# Patient Record
Sex: FEMALE | Race: WHITE | Employment: OTHER | URBAN - METROPOLITAN AREA
[De-identification: names, ages, dates, MRNs, and addresses within clinical notes are randomized per-mention and may not be internally consistent; named-entity substitution may affect disease eponyms.]

---

## 2022-10-04 ENCOUNTER — HOSPITAL ENCOUNTER (INPATIENT)
Age: 82
LOS: 6 days | Discharge: HOME | DRG: 071 | End: 2022-10-10
Attending: PHYSICAL MEDICINE & REHABILITATION | Admitting: PHYSICAL MEDICINE & REHABILITATION
Payer: MEDICARE

## 2022-10-04 PROBLEM — G93.41 METABOLIC ENCEPHALOPATHY: Status: ACTIVE | Noted: 2022-10-04

## 2022-10-04 PROCEDURE — 6370000000 HC RX 637 (ALT 250 FOR IP): Performed by: PHYSICAL MEDICINE & REHABILITATION

## 2022-10-04 PROCEDURE — 99223 1ST HOSP IP/OBS HIGH 75: CPT | Performed by: PHYSICAL MEDICINE & REHABILITATION

## 2022-10-04 PROCEDURE — 1280000000 HC REHAB R&B

## 2022-10-04 RX ORDER — METHYLPREDNISOLONE 4 MG/1
8 TABLET ORAL NIGHTLY
Status: COMPLETED | OUTPATIENT
Start: 2022-10-05 | End: 2022-10-05

## 2022-10-04 RX ORDER — SENNA PLUS 8.6 MG/1
1 TABLET ORAL NIGHTLY
Status: DISCONTINUED | OUTPATIENT
Start: 2022-10-04 | End: 2022-10-10 | Stop reason: HOSPADM

## 2022-10-04 RX ORDER — AMIODARONE HYDROCHLORIDE 200 MG/1
100 TABLET ORAL DAILY
COMMUNITY

## 2022-10-04 RX ORDER — METHYLPREDNISOLONE 4 MG/1
4 TABLET ORAL
Status: COMPLETED | OUTPATIENT
Start: 2022-10-05 | End: 2022-10-06

## 2022-10-04 RX ORDER — BISOPROLOL FUMARATE 5 MG/1
5 TABLET ORAL DAILY
Status: ON HOLD | COMMUNITY
Start: 2022-09-16 | End: 2022-10-10 | Stop reason: HOSPADM

## 2022-10-04 RX ORDER — METHYLPREDNISOLONE 4 MG/1
4 TABLET ORAL
Status: COMPLETED | OUTPATIENT
Start: 2022-10-05 | End: 2022-10-09

## 2022-10-04 RX ORDER — ACETAMINOPHEN 325 MG/1
650 TABLET ORAL
Status: DISCONTINUED | OUTPATIENT
Start: 2022-10-04 | End: 2022-10-05

## 2022-10-04 RX ORDER — RIVAROXABAN 20 MG/1
20 TABLET, FILM COATED ORAL DAILY
COMMUNITY
Start: 2022-09-17

## 2022-10-04 RX ORDER — POLYETHYLENE GLYCOL 3350 17 G/17G
17 POWDER, FOR SOLUTION ORAL DAILY PRN
Status: DISCONTINUED | OUTPATIENT
Start: 2022-10-04 | End: 2022-10-10 | Stop reason: HOSPADM

## 2022-10-04 RX ORDER — METHYLPREDNISOLONE 4 MG/1
4 TABLET ORAL
Status: COMPLETED | OUTPATIENT
Start: 2022-10-05 | End: 2022-10-07

## 2022-10-04 RX ORDER — AMIODARONE HYDROCHLORIDE 200 MG/1
100 TABLET ORAL DAILY
Status: DISCONTINUED | OUTPATIENT
Start: 2022-10-05 | End: 2022-10-10 | Stop reason: HOSPADM

## 2022-10-04 RX ORDER — LACTULOSE 10 G/15ML
20 SOLUTION ORAL 3 TIMES DAILY
Status: DISCONTINUED | OUTPATIENT
Start: 2022-10-04 | End: 2022-10-10

## 2022-10-04 RX ORDER — BACLOFEN 10 MG/1
10 TABLET ORAL 2 TIMES DAILY
Status: ON HOLD | COMMUNITY
Start: 2022-09-29 | End: 2022-10-10 | Stop reason: HOSPADM

## 2022-10-04 RX ORDER — DOCUSATE SODIUM 100 MG/1
100 CAPSULE, LIQUID FILLED ORAL DAILY
Status: DISCONTINUED | OUTPATIENT
Start: 2022-10-04 | End: 2022-10-10 | Stop reason: HOSPADM

## 2022-10-04 RX ORDER — ONDANSETRON 4 MG/1
4 TABLET, ORALLY DISINTEGRATING ORAL 4 TIMES DAILY PRN
Status: DISCONTINUED | OUTPATIENT
Start: 2022-10-04 | End: 2022-10-10 | Stop reason: HOSPADM

## 2022-10-04 RX ORDER — BUTALBITAL, ACETAMINOPHEN AND CAFFEINE 50; 325; 40 MG/1; MG/1; MG/1
1 TABLET ORAL EVERY 4 HOURS PRN
Status: DISCONTINUED | OUTPATIENT
Start: 2022-10-04 | End: 2022-10-10 | Stop reason: HOSPADM

## 2022-10-04 RX ORDER — METHYLPREDNISOLONE 4 MG/1
4 TABLET ORAL NIGHTLY
Status: COMPLETED | OUTPATIENT
Start: 2022-10-06 | End: 2022-10-08

## 2022-10-04 RX ADMIN — SENNOSIDES 8.6 MG: 8.6 TABLET, FILM COATED ORAL at 20:58

## 2022-10-04 RX ADMIN — ACETAMINOPHEN 650 MG: 325 TABLET ORAL at 20:58

## 2022-10-04 RX ADMIN — LACTULOSE 20 G: 10 SOLUTION ORAL at 21:00

## 2022-10-04 RX ADMIN — DOCUSATE SODIUM 100 MG: 100 CAPSULE, LIQUID FILLED ORAL at 20:58

## 2022-10-04 NOTE — PLAN OF CARE
ARU Interdisciplinary Plan of Care (IPOC)  [facility] Dr. Adwoa Cunningham [facility], [address]  [phone]  Fax: [phone]        Yarely Aleman    : 1940  Acct #: [de-identified]  MRN: [id number]   PHYSICIAN:  Ras Salas MD  Primary Active Problems:   Active Hospital Problems    Diagnosis Date Noted    Generalized weakness [R53.1] 10/05/2022     Priority: Medium    Gait disturbance [R26.9] 10/05/2022     Priority: Medium    GELLER (nonalcoholic steatohepatitis) [K75.81] 10/05/2022     Priority: Medium    Drug reaction [T50.905A] 10/05/2022     Priority: Medium    Essential hypertension [I10] 10/05/2022     Priority: Medium    Permanent atrial fibrillation ([facility] [address] [I48.21] 10/05/2022     Priority: Medium    Rheumatoid arthritis involving multiple sites ([facility] [address] [M06.9] 10/05/2022     Priority: Medium    Uncontrolled pain [R52] 10/05/2022     Priority: Medium    Metabolic encephalopathy [M19.80] 10/04/2022     Priority: Medium       Rehabilitation Diagnosis:     Metabolic encephalopathy [Z25.35]          CARE PLAN     NURSING:  Yarely Aleman while on this unit will:      Bowel and Bladder   [x] Be continent of bowel and bladder      [x] Have an adequate number of bowel movements   [] Urinate with no urinary retention >300ml in bladder   [] Bladder Scan: (details)   [] Complete bladder protocol with lujan removal   [] Initiate Bladder Program to toilet every ___ hours   [] Initiate Bowel Program to toilet every ___hours   [] Bladder training    [] Bowel training  Pulmonary   [x] Maintain O2 SATs at 92% or greater  Pain Management   [x] Have pain managed while on ARU        [] Be pain free by discharge    [x] Medication Management and Education  Maintenance of Skin Integrity/Wound Management   [x] Have no skin breakdown while on ARU   [] Have improved skin integrity via wound measurements   [] Have no signs/symptoms of infection via infection protection and monitoring at the          wound site  Fall Prevention   [x] Be free from injury during hospitalization via fall prevention measures     [] Disease management and Education  Precautions   [] Weight Bearing Precautions   [] Swallowing Precautions   [x] Monitoring of Risks of Complications   [x] DVT Prophylaxis    [] Fluid/electrolyte/Nutrition Management    [x] Complete education with patient/family with understanding demonstrated for   in-room safety with transfers to bed, toilet, wheelchair, shower as well as  bathroom activities and hygiene. [] Adjustment   [] Other:   Nursing interventions may include bowel/bladder training, education for medical assistive devices, medication education, O2 saturation management, energy conservation, stress management techniques, fall prevention, alarms protocol, seating and positioning, skin/wound care, pressure relief instruction,dressing changes,  infection protection, DVT prophylaxis, and/or assistance with in room safety with transfers to bed, toilet, wheelchair, shower as well as bathroom activities and hygiene. Patient/caregiver education for:   [x] Disease/sustained injury/management      [x] Medication Use   [] Surgical intervention   [x] Safety/Precautions   [x] Body mechanics and or joint protection   [x] Health maintenance         PHYSICAL THERAPY:  Goals:                  Short Term Goals  Time Frame for Short Term Goals: 7 tx days:  Short Term Goal 1: Pt will complete OOB transfers Ind. Short Term Goal 2: Pt will ambulate >/=150 ft over level surface and at least 10 ft of uneven surface Ind. Short Term Goal 3: Pt will ascend/descend curb step using SPC and 1 flight of stairs using railing/s Mod Ind. Short Term Goal 4: Pt will complete object retrieval using reacher Mod Ind. These goals were reviewed with this patient at the time of assessment and Luisa Poe is in agreement.      Plan of Care: Pt to be seen 5 days per week for a minimum of 60 minutes for 7 days. Current Treatment Recommendations: Strengthening, Balance training, Functional mobility training, Transfer training, Endurance training, Gait training, Stair training, Pain management, Home exercise program, Safety education & training, Patient/Caregiver education & training, Therapeutic activities community reintegration,animal assisted therapy, and concurrent/group therapy.     PT IRF-STEFFANY scores and goals for initial assessment:   Bed Mobility:   Sit to Lying  Assistance Needed: Independent  Comment: Ind in regular bed  CARE Score: 6  Discharge Goal: Independent  Roll Left and Right  Assistance Needed: Independent  Comment: Ind in regular bed  CARE Score: 6  Discharge Goal: Independent  Lying to Sitting on Side of Bed  Assistance Needed: Independent  Comment: Ind in regular bed  CARE Score: 6  Discharge Goal: Independent    Transfers:    Sit to Stand  Assistance Needed: Supervision or touching assistance  Comment: CGA without AD (definite use of BUE)  CARE Score: 4  Discharge Goal: Independent  Chair/Bed-to-Chair Transfer  Assistance Needed: Supervision or touching assistance  Comment: CGA without AD  CARE Score: 4  Discharge Goal: Independent     Car Transfer  Assistance Needed: Supervision or touching assistance  Comment: CGA without AD (used car as external support; self-assisted RLE)  CARE Score: 4  Discharge Goal: Independent    Ambulation:    Walking Ability  Does the Patient Walk?: Yes     Walk 10 Feet  Assistance Needed: Supervision or touching assistance  Comment: CGA without AD  CARE Score: 4  Discharge Goal: Independent     Walk 50 Feet with Two Turns  Assistance Needed: Supervision or touching assistance  Comment: CGA without AD  CARE Score: 4  Discharge Goal: Independent     Walk 150 Feet  Assistance Needed: Supervision or touching assistance  Comment: CGA without AD; pt was able to ambulate up to 160 ft today  CARE Score: 4  Discharge Goal: Independent     Walking 10 Feet on Uneven Surfaces  Assistance Needed: Supervision or touching assistance  Comment: CGA without AD over concrete and outdoor ramp  CARE Score: 4  Discharge Goal: Independent     1 Step (Curb)  Assistance Needed: Partial/moderate assistance  Comment: Min A without AD (pauses to readjust JOSE prior to stepping up/down; required HHA on descent due to fear of falling)  CARE Score: 3  Discharge Goal: Independent     4 Steps  Assistance Needed: Supervision or touching assistance  Comment: CGA using railings with consistent step-through pattern on ascent/descent of 4\"/6\" step heights  CARE Score: 4  Discharge Goal: Independent     12 Steps  Assistance Needed: Supervision or touching assistance  Comment: CGA using railings with consistent step-through pattern on ascent/descent of 4\"/6\" step heights  CARE Score: 4  Discharge Goal: Independent    Gait Deviations: []None []Slow bk  [] Increased JOSE  [] Staggers []Deviated Path  [] Decreased step length  [] Decreased step height  []Decreased arm swing  [] Shuffles  [] Decreased head and trunk rotation  []other:        Wheelchair:  w/c Ability: Wheelchair Ability  Uses a Wheelchair and/or Scooter?: No                Balance:        Object: Picking Up Object  Assistance Needed: Supervision or touching assistance  Comment: CGA in unsupported stance but required use of reacher  CARE Score: 4  Discharge Goal: Independent  OCCUPATIONAL THERAPY:  Goals:             Short Term Goals  Time Frame for Short Term Goals: STGs=LTGs :  Long Term Goals  Time Frame for Long Term Goals : 7-10 days or until d/c. Long Term Goal 1: Pt will complete oral hygiene and grooming tasks c Mod I.  Long Term Goal 2: Pt will complete total body bathing c AE PRN and Mod I.  Long Term Goal 3: Pt will complete UB dressing c IND. Long Term Goal 4: Pt will complete LB dressing c AE PRN and Mod I.  Long Term Goal 5: Pt will doff/don footwear c AE PRN and Mod I.   Additional Goals?: Yes  Long Term Goal 6: Pt will complete toileting c Mod I.  Long Term Goal 7: Pt will perform functional transfers (bed, chair, toilet, shower) c DME PRN and Mod I.  Long Term Goal 8: Pt will perform therex/therax to facilitate an increase in strength/endurance (c emphasis on dynamic standing balance/tolerance > 8 mins) c Mod I.  Long Term Goal 9: Pt will perform home management tasks c Mod I. :    These goals were reviewed with this patient at the time of assessment and Yobani Killian is in agreement    Plan of Care:  Pt to be seen 5 days per week for a minimum of 60 minutes for 10 days. Occupational Therapy Plan  Current Treatment Recommendations: Strengthening, Balance training, Functional mobility training, Endurance training, Pain management, Safety education & training, Patient/Caregiver education & training, Equipment evaluation, education, & procurement, Positioning, Self-Care / ADL, Home management training, Cognitive/Perceptual training         cognitive training, home management, energy conservation training, community reintegration, splint fabrication, patient/caregiver education and training, animal assisted therapy, and concurrent and/or group therapy. OT IRF-STEFFANY scores and goals for initial assessment:    ADLs:    Eating: Eating  Assistance Needed: Independent  Comment: Pt able to open packages to eat breakfast independently during session. CARE Score: 6  Discharge Goal: Independent       Oral Hygiene: Oral Hygiene  Assistance Needed: Supervision or touching assistance  Comment: SBA to perform oral hygiene routine in stance. CARE Score: 4  Discharge Goal: Independent    UB/LB Bathing: Shower/Bathe Self  Assistance Needed: Supervision or touching assistance  Comment: Pt completed full shower, majority seated, with CGA/SBA in stance to wash julisa area and rear.   CARE Score: 4  Discharge Goal: Independent    UB Dressing: Upper Body Dressing  Assistance Needed: Supervision or touching assistance  Comment: SBA to reach for clothing in closet, able to doff/don shirt IND. CARE Score: 4  Discharge Goal: Independent         LB Dressing: Lower Body Dressing  Assistance Needed: Supervision or touching assistance  Comment: CGA/SBA in stance to manage underwear and pants over hips. CARE Score: 4  Discharge Goal: Independent    Donning and [place] Footwear: Putting On/Taking Off Footwear  Assistance Needed: Setup or clean-up assistance  Comment: Pt able to doff/don socks and tennis shoes. CARE Score: 5  Discharge Goal: Independent      Toiletin [address] needed: Supervision or touching assistance  Comment: SBA to manage clothing in stance and able to perform perineal hygiene seated after episode of urination. CARE Score: 4  Discharge Goal: Independent      Toilet Transfers: Toilet Transfer  Assistance needed: Supervision or touching assistance  Comment: CG/SBA with use of 2WW and grab bars. CARE Score: 4  Discharge Goal: Independent      SPEECH THERAPY: (If ordered)  Plan of Care and Goals:   LTG       Duration/Frequency of Treatment  Duration of Treatment: No ST recommended                                                    LTG:                           Treatments may include speech/language/communication therapy, cognitive training, animal assisted therapy, group therapy, education, and/or dysphagia therapy based on the above goals. Co-treats where appropriate with PT or OT to facilitate patient goals in functional tasks. These goals were reviewed with this patient at the time of assessment and Felicity Camacho is in agreement. CASE MANAGEMENT:  Goals:   Assist patient/family with discharge planning, patient/family counseling, assistance in obtaining recommended equipment and other services, and coordination with insurance during ARU stay. Patient Goals:  Return to maximum level of independent function.      Nutrition goal: Patient will consume at least 50-75% at meals during stay     Activities Prior to Admit:   Homemaking Responsibilities: Yes  Active : No  Mode of Transportation: [address]: Pt enjoys reading. Sherif Jean, Spouse manages finances. Pt and spouse share grocery shopping, Jew,         Intensity of Therapy  Marycarmen Martin will be seen a minimum of 3 hours of therapy per day/a minimum of 5 out of 7 days per week. [] In this rare instance due to the nature of this patient's medical involvement, this patient will be seen 15 hours per week (900 minutes within a 7 day period). Treatments may include therapeutic exercises, gait training, neuromuscular re-ed, transfer training, community reintegration, bed mobility, w/c mobility and training, self care, home mgmt, cognitive training, energy conservation,dysphagia tx, speech/language/communication therapy, group therapy, and patient/family education. In addition, dietician/nutritionist may monitor calorie count as well as intake and collaboratively work with SLP on dietary upgrades. Neuropsychology/Psychology may evaluate and provide necessary support. Group therapy as appropriate to facilitate improved endurance, STR, COORD, function, safety, transfers, awareness and insight into deficits, problem solving, memory, and social interaction and engagement.     Medical issues being managed closely and that require 24 hour availability of a physician:   [] Swallowing Precautions                                     [] Weight bearing precautions   [] Wound Care                             [x] Infection Prevention   [x] DVT Prophylaxis/assessment              [x] Monitoring for complications    [x] Fall Precautions/Prevention                         [x] Fluid/Electrolyte/Nutrition Balance   [] Voice Protection                           [x] Medication Management   [x] Respiratory                   [x] Pain Mgmt   [x] Bowel/Bladder Fx    Medical Prognosis: [] Good  [x] Fair    [] Guarded   Total expected IRF days 14                                            Physician anticipated functional outcomes:  FWW and [facility] OT/PT and supervision. Rehab Goals:   [] Return to premorbid function of_______________________________.    [] Independent   [] Mod I  [x] Supervision  [] CGA   [] Min A   [] Mod A  Level for ambulation []without assistive device  [x] with assistive device        [] Independent   [] Mod I  [x] Supervision [] CGA   [] Min A   [] Mod A  Level for transfers []without assistive device  [x] with assistive device         [] Independent   [] Mod I  [x] Supervision [] CGA   [] Min A   [] Mod A Level with ADL's []without assistive device   [x] with assistive device     ___________________     Level with cognitive skills requiring [] No assist [x] Supervision  [] Active Assist/Cues     [] Maximize level of mobility and ADL's to decrease burden on caregiver    IPOC brief synthesis of Preadmission Screen, Post-Admission Evaluation, and Therapy Evaluations: Acute inpatient rehabilitation with occupational and physical therapy 180 minutes 5 out of every 7 days. Will address basic and  advancing mobility with self-care instruction and adaptive equipment training. Caregiver education will be offered. Expected length of stay  prior to a supervised level of function for discharge home with a walker and [facility] OT/PT is 2 weeks. Additional recommendation:     Metabolic encephalopathy with gait disturbance: The patient requires daily occupational and physical therapy with speech-language pathology. We will provide treatment in a calm and consistent environment to improve retention of new information and to reduce distractions. She will get written and verbal instructions when possible. We will involve caregivers with training is much as possible. We must minimize medication use, monitor her GELLER and provide nutritional support.   She needs adaptive equipment training, pulmonary hygiene measures and DVT prophylaxis. DVT prophylaxis: Medical team at the outside hospital resumed her Xarelto for her atrial fibrillation. This should protect her against new DVT. I must periodically monitor her hemoglobin while on this medication. Weightbearing activities will be pursued daily. GI prophylaxis is available. GELLER: Lactulose 20 g 3 times daily and monitoring her ammonia level. Avoiding acetaminophen. Uncontrolled pain: We must minimize use of acetaminophen with her liver disease. Steroid taper will be helpful. Diathermy and progressive mobilization. Range of motion exercises. Low-dose tramadol if analgesics are needed. Atrial fibrillation: She has now anticoagulated with Xarelto. Cordarone for rate control. Vital signs are checked at rest and with activity. Daily weights to detect any decompensation. Hypertension: Target systolic blood pressures 014-348. Currently her blood pressure is controlled without medications but will monitor this closely. Rheumatoid arthritis: This disease is likely contributing to her arthralgias. Her steroid taper will likely help control her symptoms in the short-term. Long-term management will be deferred to her PCP. Anticipated discharge destination:    [] Home Independently   [x] Home with supervision    []SNF     [] Other       This plan has been reviewed with me in a language I understand. I have had the opportunity to include my input with my therapy team.    ________________________________________________   ______________________  Patient/Significant Other      Date    I have reviewed this initial plan of care and agree with its contents:    Title   Name    Date    Time    Physician: Marcelo Cross MD 10/6/2022 10:54 AM    Case Mgmt:  [facility] 10/5/2022 1033    OT: Yadira Du, [address] [facility], OTR/L #[id number] 10/05/22 1612    PT: Yin Espinoza PT     10/05/2022    11:52    RN: Michael Herndon RN 10/4/22  6pm    ST: Mariluz Graham.  Liz Rodrigez, [address] [facility], [address] [place] [address] 10/5/2022  11:12 AM      Dietician: CARISSA Melendez  10/05/2022   14:05     ADMIT DATE:10/4/2022

## 2022-10-04 NOTE — PROGRESS NOTES
[facility] Admission Assessment      A Complete drug regimen review was completed for this patient this date. [x]  No clinically significant medication issue was identified   []  Yes, a clinically significant medication issue was identified     []  Adverse Drug Event:    []  Allergy:    []  Side Effect:    []  Ineffective Therapy:    []  Drug interaction:     []  Duplicate Therapy:    []  Untreated Indication:    []  Non-adherence:    []  Other:  Nursing contacted the physician:       Date:                Time:    Actions recommended by physician were completed:   Date:                 Time:  Action(s) Taken:             []  New Physician Order Received    []  Issue Noted by Physician; However No Action Required    []  Other:        Ethnicity  \"Are you of , /a, or [place] origin? \"  Check all that apply:  [x] A. No, not of , /a, or [place] Origin  [] B.  Yes, [place] American, Chicano/a  [] C.  Yes, [address] [place] [address]  [] D.  Yes, [place]  [] E.  Yes, another , , or [place] origin  [] X. Patient unable to respond    Race  \"What is your race? \"  Check all that apply:  [x] A. White  [] B. Black or   [] C. American [place] [facility] or [place] Native  [] D.  [place] [facility]  [] E. [place]  [] F. Ugandan  [] G. [place]  [] Sonya Ram  [] I. Yazu  [] J.  Other   [] K.   [] L. [place] or [place]ira  [] M. Z[place]ian  [] N. Other [facility]  [] X. Patient unable to respond    Language  A. \"What is your preferred language? \"   English    B. \"Do you need or want an  to communicate with a doctor or health care staff? \"  Check only one:  [x] 0. No  [] 1. Yes  [] 9. Unable to determine    Transportation  \"Has lack of transportation kept you from medical appointments, meetings, work, or from getting things needed for daily living? \"Check all that apply:  [] A.  Yes, it has kept me from medical appointments or from getting my medications  [] B.  Yes, it has kept me from non-medical meetings, appointments, work, or from getting things that I need  [x] C.  No  [] X. Patient unable to respond    Hearing  Ability to hear (with hearing aid or hearing appliances if normally used)  [x]  0. Adequate - no difficulty in normal conversation, social interaction, listening to TV  []  1. Minimal difficulty - difficulty in some environments (e.g. when person speaks softly or setting is noisy)  []  2. Moderate difficulty - speaker has to increase volume and speak distinctly   []  3. Highly impaired - absence of useful hearing    Vision  Ability to see in adequate light (with glasses or other visual appliances)  [x]  0. Adequate - sees fine detail, such as regular print in newspapers/books  []  1. Impaired - sees large print, but nto regular print in newspapers/books  []  2. Moderately impaired - limited vision; not able to see newspaper headlines but can identify objects  []  3. Highly impaired - object identification in question, but eyes appear to follow objects  []  4. Severely impaired - no vision or sees only light, colors, or shapes; eyes do not appear to follow objects    Health Literacy  \"How often do you need to have someone help you when you read instructions, pamphlets, or other written material from your doctor or pharmacy? \"  [x]  0. Never  []  1. Rarely  []  2. Sometimes  []  3. Often  []  4. Always  []  8. Patient unable to respond    Signs and Symptoms of Delirium  A. Acute Onset Mental Status Change - Is there evidence of an acute change in mental status from the patient's baseline? [x] 0. No  [] 1. Yes    B. Inattention - Did the patient have difficulty focusing attention, for example being easily distractible or having difficulty keeping track of what was being said? [x]  0. Behavior not present  []  1. Behavior continuously present, does not fluctuate  []  2.   Behavior present, fluctuates (comes and goes, changes in severity)    C. Disorganized thinking - Was the patient's thinking disorganized or incoherent (rambling or irrelevant conversation, unclear or illogical flow of ideas, or unpredictable switching from subject to subject)? [x]  0. Behavior not present  []  1. Behavior continuously present, does not fluctuate  []  2. Behavior present, fluctuates (comes and goes, changes in severity)    D. Altered level of consciousness - Did the patient have altered level of consciousness as indicated by any of the following criteria? Vigilant - startled easily to any sound or touch  Lethargic - repeatedly dozed off while being asked questions, but responded to voice or touch  Stuporous - very difficulty to arouse and keep aroused for the interview  Comatose - could not be aroused  [x]  0. Behavior not present  []  1. Behavior continuously present, does not fluctuate  []  2. Behavior present, fluctuates (comes and goes, changes in severity)    Mood    \"Over the last 2 weeks, have you been bothered by any of the following problems?\" 1. Symptom Presence    0 = No  1 = Yes  9 = No Response 2. Symptom Frequency    0 = Never or 1 day  1 = 2-6 days (several days)  2 = 7-11 days (half or more of the days)  3 = 12-14 days (nearly every day)  **Leave blank if 'No Reponse'**      Enter scores in boxes    Column 1 Column 2   Little interest or pleasure in doing things   [x] 0. No  [] 1. Yes  [] 9. No Response [] 0. Never or one day  [] 1.  2-6 days (several days)  [] 2.  7-11 days (half or more of days)  [] 3.  12-14 days (nearly every day)     Feeling down, depressed, or hopeless   [x] 0. No  [] 1. Yes  [] 9. No Response [] 0. Never or one day  [] 1.  2-6 days (several days)  [] 2.  7-11 days (half or more of days)  [] 3.  12-14 days (nearly every day)   **If either A or B in column 2 is coded 2 or 3, CONTINUE asking the questions below. If not, END the interview. **     Trouble falling or staying asleep, or sleeping too much   [] 0. No  [] 1. Yes  [] 9. No Response [] 0. Never or one day  [] 1.  2-6 days (several days)  [] 2.  7-11 days (half or more of days)  [] 3.  12-14 days (nearly every day   Feeling tired or having little energy   [] 0. No  [] 1. Yes  [] 9. No Response [] 0. Never or one day  [] 1.  2-6 days (several days)  [] 2.  7-11 days (half or more of days)  [] 3.  12-14 days (nearly every day   Poor appetite or overeating     [] 0. No  [] 1. Yes  [] 9. No Response [] 0. Never or one day  [] 1.  2-6 days (several days)  [] 2.  7-11 days (half or more of days)  [] 3.  12-14 days (nearly every day   Feeling bad about yourself - or that you are a failure or have let yourself or your family down   [] 0. No  [] 1. Yes  [] 9. No Response [] 0. Never or one day  [] 1.  2-6 days (several days)  [] 2.  7-11 days (half or more of days)  [] 3.  12-14 days (nearly every day   Trouble concentrating on things, such as reading the newspaper or watching television   [] 0. No  [] 1. Yes  [] 9. No Response [] 0. Never or one day  [] 1.  2-6 days (several days)  [] 2.  7-11 days (half or more of days)  [] 3.  12-14 days (nearly every day   Moving or speaking so slowly that other people could have noticed. Or the opposite- being so fidgety or restless that you have been moving around a lot more than usual.   [] 0. No  [] 1. Yes  [] 9. No Response [] 0. Never or one day  [] 1.  2-6 days (several days)  [] 2.  7-11 days (half or more of days)  [] 3.  12-14 days (nearly every day   Thoughts that you would be better off dead, or of hurting yourself in some way.   [] 0. No  [] 1. Yes  [] 9. No Response [] 0. Never or one day  [] 1.  2-6 days (several days)  [] 2.  7-11 days (half or more of days)  [] 3.  12-14 days (nearly every day     Social Isolation  \"How often do you feel lonely or isolated from those around you? \"  [] 0. Never  [] 1. Rarely  [] 2. Sometimes  [] 3. Often  [] 4. Always  [] 8.   Patient unable to respond    Pain Effect on Sleep  \"Over the past 5 days, how much of the time has pain made it hard for you to sleep at night? \"  []  0. Does not apply - I have not had any pain or hurting in the past 5 days  []  1. Rarely or not at all  [x]  2. Occasionally  []  3. Frequently  []  4. Almost constantly  []  8. Unable to answer  **If the patient answers \"0. Does not apply\" to this question, skip the next two \"Pain Effect. Author Pedro Author Pedro \" questions**    Pain Interference with Therapy Activities  \"Over the past 5 days, how often have you limited your participation in rehabilitation therapy sessions due to pain? \"  [x]  0. Does not apply - I have not received rehabilitation therapy in the past 5 days  []  1. Rarely or not at all  []  2. Occasionally  []  3. Frequently  []  4. Almost constantly  []  8. Unable to answer    Pain Interference with Day-to-Day Activities: \"Over the past 5 days, how often have you limited your day-to-day activities (excluding rehabilitation therapy session)? \"  []  1. Rarely or not at all  [x]  2. Occasionally  []  3. Frequently  []  4. Almost constantly  []  8. Unable to answer    Nutritional Approaches  Check all of the following nutritional approaches that apply on admission:  []  A. Parenteral/IV feeding  []  B. Feeding tube (e.g., nasogastric or abdominal (PEG))  []  C. Mechanically altered diet - requires change in texture of food or liquids (e.g., pureed food, thickened liquids)  []  D. Therapeutic diet (e.g., low salt, diabetic, low cholesterol)  [x]  Z. None of the above    High Risk Drug Classes:  Use and Indication    Is taking: Check if the pt is taking any medications by pharmacological classification, not how it is used, in the following classes  Indication noted:  If column 1 is checked, check if there is an indication noted for all meds in the drug class Is taking  (check all that apply) Indication noted (check all that apply)   Antipsychotic [] [] Anticoagulant [] []   Antibiotic [] []   Opioid [] []   Antiplatelet [x] [x]   Hypoglycemic (including insulin) [] []   None of the above []     Special Treatments, Procedures, and Programs    Check all of the following treatments, procedures, and programs that apply on admission. On admission (check all that apply)   Cancer Treatments   A1. Chemotherapy []           A2. IV []           A3. Oral []           A10. Other []   B1. Radiation []   Respiratory Therapies   C1. Oxygen Therapy []           C2. Continuous []           C3. Intermittent []           C4. High-concentration []   D1. Suctioning []           D2. Scheduled []           D3. As needed []   E1. Tracheostomy Care []   F1. Invasive Mechanical Ventilator (ventilator or respirator) []   G1. Non-invasive Mechanical Ventilator []           G2. BiPAP []           G3. CPAP []   Other   H1. IV Medications []           H2. Vasoactive medications []           H3. Antibiotics []           H4. Anticoagulation []           H10. Other []   I1. Transfusions []   J1. Dialysis []           J2. Hemodialysis []           J3. Peritoneal dialysis []   O1. IV access []           O2. Peripheral []           O3. Midline []           O4.  Central (PICC, tunneled, port) []      None of the above (select if no Cancer, Respiratory, or Other boxes are checked) [x]

## 2022-10-05 PROBLEM — K75.81 NASH (NONALCOHOLIC STEATOHEPATITIS): Status: ACTIVE | Noted: 2022-10-05

## 2022-10-05 PROBLEM — T50.905A DRUG REACTION: Status: ACTIVE | Noted: 2022-10-05

## 2022-10-05 PROBLEM — I10 ESSENTIAL HYPERTENSION: Status: ACTIVE | Noted: 2022-10-05

## 2022-10-05 PROBLEM — R52 UNCONTROLLED PAIN: Status: ACTIVE | Noted: 2022-10-05

## 2022-10-05 PROBLEM — M06.9 RHEUMATOID ARTHRITIS INVOLVING MULTIPLE SITES (HCC): Status: ACTIVE | Noted: 2022-10-05

## 2022-10-05 PROBLEM — I48.21 PERMANENT ATRIAL FIBRILLATION (HCC): Status: ACTIVE | Noted: 2022-10-05

## 2022-10-05 PROBLEM — R53.1 GENERALIZED WEAKNESS: Status: ACTIVE | Noted: 2022-10-05

## 2022-10-05 PROBLEM — R26.9 GAIT DISTURBANCE: Status: ACTIVE | Noted: 2022-10-05

## 2022-10-05 PROCEDURE — 97535 SELF CARE MNGMENT TRAINING: CPT

## 2022-10-05 PROCEDURE — 99232 SBSQ HOSP IP/OBS MODERATE 35: CPT | Performed by: PHYSICAL MEDICINE & REHABILITATION

## 2022-10-05 PROCEDURE — 6370000000 HC RX 637 (ALT 250 FOR IP): Performed by: PHYSICAL MEDICINE & REHABILITATION

## 2022-10-05 PROCEDURE — 6360000002 HC RX W HCPCS: Performed by: PHYSICAL MEDICINE & REHABILITATION

## 2022-10-05 PROCEDURE — 1280000000 HC REHAB R&B

## 2022-10-05 PROCEDURE — 97162 PT EVAL MOD COMPLEX 30 MIN: CPT

## 2022-10-05 PROCEDURE — 97530 THERAPEUTIC ACTIVITIES: CPT

## 2022-10-05 PROCEDURE — 97116 GAIT TRAINING THERAPY: CPT

## 2022-10-05 PROCEDURE — 94761 N-INVAS EAR/PLS OXIMETRY MLT: CPT

## 2022-10-05 PROCEDURE — 97166 OT EVAL MOD COMPLEX 45 MIN: CPT

## 2022-10-05 PROCEDURE — 92523 SPEECH SOUND LANG COMPREHEN: CPT

## 2022-10-05 RX ORDER — BISACODYL 10 MG
10 SUPPOSITORY, RECTAL RECTAL PRN
Status: DISCONTINUED | OUTPATIENT
Start: 2022-10-05 | End: 2022-10-10 | Stop reason: HOSPADM

## 2022-10-05 RX ADMIN — AMIODARONE HYDROCHLORIDE 100 MG: 200 TABLET ORAL at 08:14

## 2022-10-05 RX ADMIN — METHYLPREDNISOLONE 4 MG: 4 TABLET ORAL at 13:39

## 2022-10-05 RX ADMIN — BISACODYL 10 MG: 10 SUPPOSITORY RECTAL at 19:10

## 2022-10-05 RX ADMIN — DOCUSATE SODIUM 100 MG: 100 CAPSULE, LIQUID FILLED ORAL at 08:14

## 2022-10-05 RX ADMIN — LACTULOSE 20 G: 10 SOLUTION ORAL at 13:40

## 2022-10-05 RX ADMIN — RIVAROXABAN 20 MG: 20 TABLET, FILM COATED ORAL at 08:14

## 2022-10-05 RX ADMIN — LACTULOSE 20 G: 10 SOLUTION ORAL at 20:08

## 2022-10-05 RX ADMIN — ACETAMINOPHEN 650 MG: 325 TABLET ORAL at 08:14

## 2022-10-05 RX ADMIN — METHYLPREDNISOLONE 4 MG: 4 TABLET ORAL at 18:48

## 2022-10-05 RX ADMIN — METHYLPREDNISOLONE 4 MG: 4 TABLET ORAL at 06:14

## 2022-10-05 RX ADMIN — SENNOSIDES 8.6 MG: 8.6 TABLET, FILM COATED ORAL at 20:08

## 2022-10-05 RX ADMIN — METHYLPREDNISOLONE 8 MG: 4 TABLET ORAL at 20:09

## 2022-10-05 RX ADMIN — LACTULOSE 20 G: 10 SOLUTION ORAL at 08:14

## 2022-10-05 NOTE — PROGRESS NOTES
Comprehensive Nutrition Assessment    Type and Reason for Visit:  Initial, Consult (oral nutrition supplement)    Nutrition Recommendations/Plan:   Continue regular diet   Will trial oral nutrition supplement during stay  Encourage consistent meal intake  Will continue to follow up during stay      Malnutrition Assessment:  Malnutrition Status:  Insufficient data (10/05/22 1400)    Context:  Social/Environmental Circumstances       Nutrition Assessment:    Admit to acute rehab with hx weakness, metabolic encephalopathy. Able to tolerate regular diet. Reports loss of smell and taste since COVID-19 2 years ago. Inconsistent meals at home. Meal intake today %. Able to taste mainly sweets and some spicy foods. Discussed option to trial oral nutriton supplement during stay, hesitant but did agree to trial. Per hx no signficant weight loss in past 6 months. Will follow at moderate nutrition risk at this time. Nutrition Related Findings:    sitting up in chair on visit, lunch tray in room did eat more than 75% of meal, hx HTN, RA, GELLER Wound Type: None       Current Nutrition Intake & Therapies:    Average Meal Intake: %  Average Supplements Intake: None Ordered  ADULT DIET; Regular    Anthropometric Measures:  Height: 5' 3\" (160 cm)  Ideal Body Weight (IBW): 115 lbs (52 kg)    Admission Body Weight: 160 lb (72.6 kg) (per MD office in May)  Current Body Weight: 156 lb 15.5 oz (71.2 kg), 136.5 % IBW. Weight Source: Bed Scale  Current BMI (kg/m2): 27.8        Weight Adjustment For: No Adjustment                 BMI Categories: Overweight (BMI 25.0-29. 9)    Estimated Daily Nutrient Needs:  Energy Requirements Based On: Formula  Weight Used for Energy Requirements: Current  Energy (kcal/day): 8524-0157  Weight Used for Protein Requirements: Ideal  Protein (g/day): 52-62 (1-1.2 g/kg)  Method Used for Fluid Requirements: ml/Kg  Fluid (ml/day): 1700 (25 ml/kg)    Nutrition Diagnosis:   Predicted inadequate energy intake related to altered taste perception as evidenced by poor intake prior to admission    Nutrition Interventions:   Food and/or Nutrient Delivery: Continue Current Diet, Start Oral Nutrition Supplement  Nutrition Education/Counseling: Education initiated  Coordination of Nutrition Care: Continue to monitor while inpatient  Plan of Care discussed with: patient/spouse in room    Goals:     Goals: PO intake 75% or greater, by next RD assessment       Nutrition Monitoring and Evaluation:   Behavioral-Environmental Outcomes: None Identified  Food/Nutrient Intake Outcomes: Food and Nutrient Intake, Supplement Intake  Physical Signs/Symptoms Outcomes: Biochemical Data, Meal Time Behavior, Skin, Weight, Nutrition Focused Physical Findings    Discharge Planning:    Continue current diet     Rosario Shine RD, LD  Contact: [phone]

## 2022-10-05 NOTE — PROGRESS NOTES
4 Eyes Skin Assessment     NAME:  Darek Louie  YOB: 1940  MEDICAL RECORD NUMBER:  [id number]    The patient is being assess for  Admission    I agree that 2 RN's have performed a thorough Head to Toe Skin Assessment on the patient. ALL assessment sites listed below have been assessed. Areas assessed by both nurses:    Head, Face, Ears, Shoulders, Back, Chest, Arms, Elbows, Hands, Sacrum. Buttock, Coccyx, Ischium, and Legs. Feet and Heels        Does the Patient have a Wound?  No noted wound(s)       Aryan Prevention initiated:  Yes   Wound Care Orders initiated:  NA    Pressure Injury (Stage 3,4, Unstageable, DTI, NWPT, and Complex wounds) if present place referral/consult order under [de-identified] NA    New and Established Ostomies if present place consult order under : NA      Nurse 1 eSignature: Electronically signed by Costa Montejo LPN on 26/2/41 at 4:81 PM EDT    **SHARE this note so that the co-signing nurse is able to place an eSignature**    Nurse 2 eSignature: Electronically signed by Joanna Crespo LPN on 23/9/73 at 7:34 PM EDT

## 2022-10-05 NOTE — H&P
Umu Jj    : 1940  [facility]  MRN: [id number]              History and physical      Admitting diagnosis: Encephalopathy ( [address])    Comorbid diagnoses impacting rehabilitation: Generalized weakness, gait disturbance, toxic reaction to baclofen, Geller with hyperammonemia, permanent atrial fibrillation, essential hypertension, chronic back pain, rheumatoid arthritis. Chief complaint: Back pain and poor sleep recently. History of present illness: Patient is an 82-year-old right-hand-dominant female who has chronic gait disturbance related to spinal issues and chronic neck and low back pain. On 10/2/2022 she presented to an outside emergency department with mental status changes and difficulty walking. This followed a COVID-19 infection in 2022 and a recalcitrant headache that had not been responding to outpatient medications of a wide variety. Recently she had been started on Ubrelvy and baclofen for the headaches. In the ED, she was found to be delirious with generalized weakness and difficulty swallowing. Her ammonia level was up but her head CT was clear. Chest and urine was study but no signs of infection were identified. A follow-up abdominal CT showed evidence of GELLER. Blood pressures were fluctuating as well. After neurologic consultation, it was decided that she had had a drug reaction to the baclofen complicating a rise in ammonia due to GELLER (metabolic encephalopathy). She was taken off baclofen, analgesics were reduced and she was treated with lactulose. Her mental status has yet to return to normal but is improving to where she can engage purposefully in therapies. She is unable to do her own toileting, transfers and self-care and cannot return directly home. She requires inpatient rehabilitation to address these issues. Review of systems: Chronic neck and back pain and stiffness. Positional dizziness. Poor appetite. Some urinary incontinence.   The remainder of their review of systems was negative except as mentioned in the history of present illness. Social History: Patient is  and she would use furniture cruising as her main way of getting through her home. She needs supervision with her medications. She was independent with her own personal care. She does not use tobacco nor alcohol. Prior (baseline) level of function: Modified independent with mobility and self-care. Current level of function: Moderate verbal cues and physical assistance needed for mobility and self-care. Allergies:  Patient has no known allergies. Past Medical History:   Cervical and lumbar arthritis with past surgeries on both areas.   Hypertension, osteopenia, rheumatoid arthritis, atrial fibrillation, COVID infection August 2022    Past Surgical History:     Cervical and lumbar spinal surgeries (the most recent in 2015), abdominal surgery    Current Medications:     Current Facility-Administered Medications:     [START ON 10/5/2022] amiodarone (CORDARONE) tablet 100 mg, 100 mg, Oral, Daily, BECKY Pires MD    methylPREDNISolone (MEDROL) tablet 24 mg, 24 mg, Oral, Once, MD Alexandria Ng ON 10/5/2022] methylPREDNISolone (MEDROL) tablet 4 mg, 4 mg, Oral, QAM AC, BECKY Pires MD    [START ON 10/5/2022] methylPREDNISolone (MEDROL) tablet 4 mg, 4 mg, Oral, Lunch, MD Alexandria Ng ON 10/5/2022] methylPREDNISolone (MEDROL) tablet 4 mg, 4 mg, Oral, Dinner, BECKY Pires MD    [START ON 10/5/2022] methylPREDNISolone (MEDROL) tablet 8 mg, 8 mg, Oral, Nightly, MD Alexandria Ng ON 10/6/2022] methylPREDNISolone (MEDROL) tablet 4 mg, 4 mg, Oral, Nightly, BECKY Pires MD    lactulose (CHRONULAC) 10 GM/15ML solution 20 g, 20 g, Oral, TID, MD Alexandria Ng ON 10/5/2022] rivaroxaban (XARELTO) tablet 20 mg, 20 mg, Oral, Daily with breakfast, Greg Conrad MD    butalbital-acetaminophen-caffeine (FIORICET, ESGIC) per tablet 1 tablet, 1 tablet, Oral, Q4H PRN, BECKY Sage MD    senna (SENOKOT) tablet 8.6 mg, 1 tablet, Oral, Nightly, BECKY Sage MD    docusate sodium (COLACE) capsule 100 mg, 100 mg, Oral, Daily, BECKY Sage MD    ondansetron (ZOFRAN-ODT) disintegrating tablet 4 mg, 4 mg, Oral, 4x Daily PRN, BECKY Sage MD    acetaminophen (TYLENOL) tablet 650 mg, 650 mg, Oral, 4x Daily WC, BECKY Sage MD    polyethylene glycol (GLYCOLAX) packet 17 g, 17 g, Oral, Daily PRN, BECKY Sage MD    magnesium hydroxide (MILK OF MAGNESIA) 400 MG/5ML suspension 30 mL, 30 mL, Oral, Daily PRN, BECKY Sage MD    Family History:   Hypertension, arthritis and heart disease    Exam:    Blood pressure 132/78, pulse 74, temperature 98.3 °F (36.8 °C), temperature source Oral, resp. rate 18, height 5' 3\" (1.6 m), weight 152 lb (68.9 kg). General: The patient was seen semirecumbent in bed. She was easily distracted but able to answer some questions directly. Hard of hearing. Oriented x2 only. HEENT: No signs of trauma to her head or neck. Only about 35 degrees of active cervical rotation bilaterally. She could bring her chin to chest without hesitation. Visual fields seem full. MMM. Neck supple without adenopathy or JVD. Pulmonary: Bibasilar rhonchi without wheezes or rales. Cardiac: Her rate was controlled but premature beats were noted. Soft systolic murmur. Abdomen: Patient's abdomen was soft and nondistended. Bowel sounds were present throughout. There was no rebound, guarding or masses noted. Spinal exam: Diminished lumbar lordosis. Guarded cervical and lumbar spinal range of motion. No gross malformations. Upper extremities: Patient was able to bring both hands up to her chin.  strength was fair. Tone was normal.  No tremor. Sensation was intact. Arthritic changes within the wrist and fingers bilaterally. Lower extremities: No signs of DVT. Trace edema. Giveaway with MMT across the hips, knees and ankles with arthralgias. Sitting balance was fair. Standing balance was poor. No results found for: WBC, HGB, HCT, MCV, PLT  No results found for: INR, PROTIME  No results found for: CREATININE, BUN, NA, K, CL, CO2  No results found for: ALT, AST, GGT, ALKPHOS, BILITOT      Impression: 82-year-old female with significant cervical and lumbar spinal disease and past surgeries, chronic pain, atrial fibrillation, hypertension and rheumatoid arthritis who was suffered mental status changes associated with [place] and a reaction to baclofen. Strengths for the patient: Supportive family, some experience with adaptive equipment and a reasonably accessible home. Limitations/barriers for the patient: Her age, her confusion and her risk for falling raising the risks of using anticoagulation for her A. fib. Recommendation: Acute inpatient rehabilitation with occupational and physical therapy 180 minutes 5 out of every 7 days. Will address basic and  advancing mobility with self-care instruction and adaptive equipment training. Caregiver education will be offered. Expected length of stay  prior to a supervised level of function for discharge home with a walker and [facility] OT/PT is 2 weeks. Additional recommendation:    Metabolic encephalopathy with gait disturbance: The patient requires daily occupational and physical therapy with speech-language pathology. We will provide treatment in a calm and consistent environment to improve retention of new information and to reduce distractions. She will get written and verbal instructions when possible. We will involve caregivers with training is much as possible. We must minimize medication use, monitor her GELLER and provide nutritional support. She needs adaptive equipment training, pulmonary hygiene measures and DVT prophylaxis.   DVT prophylaxis: Medical team at the outside hospital resumed her Xarelto for her atrial fibrillation. This should protect her against new DVT. I must periodically monitor her hemoglobin while on this medication. Weightbearing activities will be pursued daily. GI prophylaxis is available. GELLER: Lactulose 20 g 3 times daily and monitoring her ammonia level. Avoiding acetaminophen. Uncontrolled pain: We must minimize use of acetaminophen with her liver disease. Steroid taper will be helpful. Diathermy and progressive mobilization. Range of motion exercises. Low-dose tramadol if analgesics are needed. Atrial fibrillation: She has now anticoagulated with Xarelto. Cordarone for rate control. Vital signs are checked at rest and with activity. Daily weights to detect any decompensation. Hypertension: Target systolic blood pressures [id number]. Currently her blood pressure is controlled without medications but will monitor this closely. Rheumatoid arthritis: This disease is likely contributing to her arthralgias. Her steroid taper will likely help control her symptoms in the short-term. Long-term management will be deferred to her PCP. I personally performed a history and physical on this patient within 24 hours of admission to the rehab unit. I have reviewed the preadmission screening and concur with its findings without change. A detailed plan of care will be established by hospital day 4 and I attest the patient is appropriate for inpatient rehabilitation at this time. I have compared the patient's current functional status noted during my history and physical with that of the preadmission screen and I have found no significant differences.

## 2022-10-05 NOTE — PATIENT CARE CONFERENCE
ACUTE REHAB TEAM CONFERENCE SUMMARY   [address] [facility]    NAME: Miguel Angel Cummings  : 1940 ADMIT DATE: 10/4/2022    Rehab Admitting Dx: Metabolic encephalopathy [X84.28]  Patient Comorbid Conditions: Active Hospital Problems    Diagnosis Date Noted    Generalized weakness [R53.1] 10/05/2022     Priority: Medium    Gait disturbance [R26.9] 10/05/2022     Priority: Medium    GELLER (nonalcoholic steatohepatitis) [K75.81] 10/05/2022     Priority: Medium    Drug reaction [T50.905A] 10/05/2022     Priority: Medium    Essential hypertension [I10] 10/05/2022     Priority: Medium    Permanent atrial fibrillation ([facility] [address] [I48.21] 10/05/2022     Priority: Medium    Rheumatoid arthritis involving multiple sites ([facility] [address] [M06.9] 10/05/2022     Priority: Medium    Uncontrolled pain [R52] 10/05/2022     Priority: Medium    Metabolic encephalopathy [L13.54] 10/04/2022     Priority: Medium     Date: 10/6/2022    CASE MANAGEMENT  Current issues/needs regarding patient and family discharge status:   patient plans dc 1-level home with supportive spouse. No AYALA    PHYSICAL THERAPY (Updated in QI)  Short Term Goals  Time Frame for Short Term Goals: 7 tx days:  Short Term Goal 1: Pt will complete OOB transfers Ind. Short Term Goal 2: Pt will ambulate >/=150 ft over level surface and at least 10 ft of uneven surface Ind. Short Term Goal 3: Pt will ascend/descend curb step using SPC and 1 flight of stairs using railing/s Mod Ind. Short Term Goal 4: Pt will complete object retrieval using reacher Mod Ind. Impairments/deficits, barriers:     Body Structures, Functions, Activity Limitations Requiring Skilled Therapeutic Intervention: Decreased functional mobility , Decreased ADL status, Decreased strength, Decreased balance, Decreased high-level IADLs, Increased pain     Therapy Prognosis: Good  Decision Making: Medium Complexity  Clinical Presentation: evolving with changing characteristics  Equipment needed at discharge: none       PT IRF-STEFFANY scores since initial assessment  Bed Mobility:   Roll Left and Right  Assistance Needed: Independent  Comment: Ind in regular bed  CARE Score: 6  Discharge Goal: Independent    Sit to Lying  Assistance Needed: Independent  Comment: Ind in regular bed  CARE Score: 6  Discharge Goal: Independent    Lying to Sitting on Side of Bed  Assistance Needed: Independent  Comment: Ind in regular bed  CARE Score: 6  Discharge Goal: Independent    Transfers:    Sit to Stand  Assistance Needed: Supervision or touching assistance  Comment: SBA without AD (definite use of BUE)  CARE Score: 4  Discharge Goal: Independent    Chair/Bed-to-Chair Transfer  Assistance Needed: Supervision or touching assistance  Comment: SBA without AD (definite use of BUE)  CARE Score: 4  Discharge Goal: Independent         Car Transfer  Assistance Needed: Supervision or touching assistance  Comment: CGA without AD (used car as external support; self-assisted RLE)  CARE Score: 4  Discharge Goal: Independent    Ambulation:    Walking Ability  Does the Patient Walk?: Yes     Walk 10 Feet  Assistance Needed: Supervision or touching assistance  Comment: SBA without AD  CARE Score: 4  Discharge Goal: Independent     Walk 50 Feet with Two Turns  Assistance Needed: Supervision or touching assistance  Comment: SBA without AD  CARE Score: 4  Discharge Goal: Independent     Walk 150 Feet  Assistance Needed: Supervision or touching assistance  Comment: SBA without AD ; pt was able to ambulate up to 181 ft today  CARE Score: 4  Discharge Goal: Independent     Walking 10 Feet on Uneven Surfaces  Assistance Needed: Supervision or touching assistance  Comment: CGA without AD over concrete and outdoor ramp  CARE Score: 4  Discharge Goal: Independent     1 Step (Curb)  Assistance Needed: Partial/moderate assistance  Comment: Min A without AD (pauses to readjust JOSE prior to stepping up/down; required HHA on descent due to fear of falling)  CARE Score: 3  Discharge Goal: Independent     4 Steps  Assistance Needed: Supervision or touching assistance  Comment: SBA using railings with step-to pattern on ascent/descent of 6\" step height  CARE Score: 4  Discharge Goal: Independent     12 Steps  Assistance Needed: Supervision or touching assistance  Comment: SBA using railings that progressed from step-to to step-through pattern on ascent/descent of 6\" step height  CARE Score: 4  Discharge Goal: Independent           Wheelchair:  w/c Ability: Wheelchair Ability  Uses a Wheelchair and/or Scooter?: No                        Balance:        Object: Picking Up Object  Assistance Needed: Supervision or touching assistance  Comment: CGA in unsupported stance but required use of reacher  CARE Score: 4  Discharge Goal: Independent    Fall Risk: []  Yes  []  No    OCCUPATIONAL THERAPY  (Updated in QI)  Short Term Goals  Time Frame for Short Term Goals: STGs=LTGs :   Long Term Goals  Time Frame for Long Term Goals : 7-10 days or until d/c. Long Term Goal 1: Pt will complete oral hygiene and grooming tasks c Mod I.  Long Term Goal 2: Pt will complete total body bathing c AE PRN and Mod I.  Long Term Goal 3: Pt will complete UB dressing c IND. Long Term Goal 4: Pt will complete LB dressing c AE PRN and Mod I.  Long Term Goal 5: Pt will doff/don footwear c AE PRN and Mod I.   Additional Goals?: Yes  Long Term Goal 6: Pt will complete toileting c Mod I.  Long Term Goal 7: Pt will perform functional transfers (bed, chair, toilet, shower) c DME PRN and Mod I.  Long Term Goal 8: Pt will perform therex/therax to facilitate an increase in strength/endurance (c emphasis on dynamic standing balance/tolerance > 8 mins) c Mod I.  Long Term Goal 9: Pt will perform home management tasks c Mod I. :        OT IRF-STEFFANY scores and goals since initial assessment:    ADLs:    Eating: Eating  Assistance Needed: Independent  Comment: Pt able to open packages to eat breakfast independently during session. CARE Score: 6  Discharge Goal: Independent       Oral Hygiene: Oral Hygiene  Assistance Needed: Supervision or touching assistance  Comment: SBA to perform oral hygiene routine in stance. CARE Score: 4  Discharge Goal: Independent    Toiletin [address] needed: Supervision or touching assistance  Comment: SBA to manage clothing in stance and able to perform perineal hygiene seated after episode of urination. CARE Score: 4  Discharge Goal: Independent      UB/LB Bathing: Shower/Bathe Self  Assistance Needed: Supervision or touching assistance  Comment: Pt completed full shower, majority seated, with CGA/SBA in stance to wash julisa area and rear. CARE Score: 4  Discharge Goal: Independent    UB Dressing: Upper Body Dressing  Assistance Needed: Supervision or touching assistance  Comment: SBA to reach for clothing in closet, able to doff/don shirt IND. CARE Score: 4  Discharge Goal: Independent         LB Dressing: Lower Body Dressing  Assistance Needed: Supervision or touching assistance  Comment: CGA/SBA in stance to manage underwear and pants over hips. CARE Score: 4  Discharge Goal: Independent    Donning and [place] Footwear: Putting On/Taking Off Footwear  Assistance Needed: Setup or clean-up assistance  Comment: Pt able to doff/don socks and tennis shoes. CARE Score: 5  Discharge Goal: Independent      Toilet Transfers: Toilet Transfer  Assistance needed: Supervision or touching assistance  Comment: CG/SBA with use of 2WW and grab bars. CARE Score: 4  Discharge Goal: Independent      Impairments/deficits, barriers:  Functional balance, endurance, FMC, strength, and ADLs.   Assessment  Performance deficits / Impairments: Decreased functional mobility , Decreased strength, Decreased endurance, Decreased ADL status, Decreased safe awareness, Decreased high-level IADLs, Decreased cognition, Decreased balance, Decreased fine motor control  Decision Making: Medium Complexity  Equipment needed at discharge:No equipment needed. COGNITIVE FUNCTION/SPEECH THERAPY (AS INDICATED)  LTG       Mild memory deficits with good compensation  Duration/Frequency of Treatment  Duration of Treatment: No ST recommended                                        Nursing Current Medical Status:   [x] Is continent of bowel and bladder     [] Is incontinent of bowel and bladder    [] Has had an adequate number of bowel movements   [] Urinates with no urinary retention >300ml in bladder   [] Targeting bladder protocol with lujan removal   [x] Maintaining O2 SATs at 92% or greater   [] Has pain managed while on ARU         [x] Has had no skin breakdown while on ARU   [] Has improved skin integrity via wound measurements   [] Has no signs/symptoms of infection at the wound site   [] Pressure wounds Stage/Location:    [] Arrived on unit with pressure wound  [x] Has been free from injury during hospitalization   [] Has experienced a fall during hospitalization  [] Ongoing education with patient/family with understanding demonstrated for:  [] Receives IV Fluids  [] Other:        NUTRITION  Weight: 165 lb 9.1 oz (75.1 kg) / Body mass index is 29.33 kg/m². Current diet: ADULT DIET; Regular  ADULT ORAL NUTRITION SUPPLEMENT; Dinner; Standard High Calorie/High Protein Oral Supplement  Intake: Recent meal intake %, trial of oral nutrition supplement      Medical improvements/barriers: Progressing well        Team goals for next treatment period/Intervention for current barriers:   [] Pt will increase activity tolerance for daily tasks.   [] Pt will improve bed mobility with reduced assist.  [] Pt will improve safety in fx tasks with reduced cues/assist  [] Pt will improve transfers with reduced assist  [] Pt will improve toileting with reduced assist  [] Pt will improve ADL's with use of adaptive equipment with reduced assist  [] Pt will improve pain mgmt for maximum participation in tx program  [] Pt will improve communication to get basic needs met on unit  [] Pt will improve swallowing for safe diet advancement with use of strategies  []  Plan for discharge to home. Patient Strengths: Motivated, Cooperative, and Pleasant    Justification for Continued Stay  Based on my medical assessment of the patient and review of information from the interdisciplinary team as part of this weekly team conference, the patient continues to meet the following criteria for IRF level of care: The patient requires active and ongoing intervention of multiple therapy disciplines  The patient requires and intensive rehabilitation therapy program  The patient requires continued physician supervision by a rehabilitation physician  The patient requires 24 hours rehab nursing care  The patient requires an intensive and coordinated interdisciplinary team approach to the delivery of rehabilitative care. Assessment/Plan   []  The patient is making good progression towards their long term goals and is actively participating in and has a reasonable expectation to continue to benefit from the intensive rehabilitation therapy program   []  The estimated discharge date has been changed from initial team conference due to:   []  The estimated discharge destination has been changed from initial team conference due to:         Ongoing tx following discharge: []HHC     []OUTPATIENT     [x] No Further ST, PT, OT Treatment     [] Family/Caregiver Training  []  Transitional Living Arrangement   [] Home Assessment (date  )     [] Family Conference   []  Therapeutic Pass       []  Other: (specify)    Estimated Discharge Date: 10/10/22    Estimated Discharge Destination: []home alone   []home alone with assist prn  []Continuous supervision [x]Return home with s/o/spouse/family   [] Assisted living    []SNF     Team members participating in today's conference.     [x] BECKY Collier, Medical Director  [x] FAUSTINO KempT,       [] Diana White, RN Nurse Supervisor     [x]  Cierra Herrera, PT  []  Herminio Swanson, PT       [] Jazlyn Costello, OT   [x] Facundo Alexis, OT  [] Henrietta Meckel, OT     [x]  Annabelle July, SLP    []  Paola Martini, SLP   [] Daron Juarez, PHUC      [x]Megan Goldman,   []Gaviota Ramesh MSW, LSW,      [] Jazmine Sanchez RN   [x] Justen Melgar RN    [] Ok Brizuela, LINDA    [] Juan José Joel RN    [] Linn Arroyo, LINDA   [] Harvey Primrose, LINDA       I have led this Team Conference and agree with the plan, Emilia Quijano MD, 10/6/2022, 12:39 PM  Goals have been updated to reflect recent status.     Team conference note transcribed this date by: Danish Tapia [place], [id number] [facility], Therapy Coordinator

## 2022-10-05 NOTE — PROGRESS NOTES
Facility/Department: [facility]  Initial Speech/Language/Cognitive Assessment    NAME: Jaky Redd  : 1940   MRN: [id number]  ADMISSION DATE: 10/4/2022  ADMITTING DIAGNOSIS: has Metabolic encephalopathy; Generalized weakness; Gait disturbance; GELLER (nonalcoholic steatohepatitis); Drug reaction; Essential hypertension; Permanent atrial fibrillation ([address] Rheumatoid arthritis involving multiple sites [facility]); and Uncontrolled pain on their problem list.  DATE ONSET: 2022    Date of Eval: 10/5/2022   Evaluating Therapist: Nickie Lamb SLP      Primary Complaint: I just wish I could get up on my own. Reinforced rationale for supervision and hands on assist anytime pt gets up. Pt accepting of feedback. Pain:  Pain Assessment  Pain Assessment: None - Denies Pain    Vision/ Hearing  Vision  Vision: Impaired  Vision Exceptions: Wears glasses at all times  Hearing  Hearing Exceptions: Hard of hearing/hearing concerns;Bilateral hearing aid    Assessment:  Per PAS: Kalyani Barroso is an [de-identified] y/o female who presented with AMS. Patients  states she has been complaining of constant headache x 1 month. She had covid in 2022 and the headaches began intermittently at that time. She was recently started on Baclofen, Fioricet, and Ubrelvy by PCP for headaches.  states that he woke up at 3am to find her half dressed roaming around the kitchen talking \"gibberish. \" Head CT nonacute. She was started on Decadron taper for headaches. She was also found with hyperammonemia r/t hepatic steatosis and started on Lactulose. She was dx with toxic encephalopathy r/t Baclofen. She has improved mental status but not yet back to baseline. She was IND prior without use of AD though  endorses furniture cruising in the house at times. She now presents as MIN A in physical functioning and MIN-MOD A in ADL's. Covid Negative on 10/2/22.   Medical/Surgical History  PMH: Arthritis, Chronic back pain, HTN, Osteopenia, RA, A-fib, lumbago, Covid infection 8/2022 PSH: Abdomen surgery, Back surgery x3 including lumbar and cervical procedures in 2015. Recommendations:  Recommendations  Requires SLP Intervention: No  Patient Education: results and recommendations; rehab expectations  Patient Education Response: Verbalizes understanding  Duration of Treatment: No ST recommended          Plan:   Speech Therapy Prognosis  Prognosis: Good  Individuals consulted  Consulted and agree with results and recommendations: Patient  Safety Devices  Safety Devices in place: Yes  Type of devices: Call light within reach; Chair alarm in place; Left in chair    Goals:  Long Term Goals  Time Frame for Long Term Goals: No ST recommended at this time. Patient/family involved in developing goals and treatment plan: Pt in agreement    Subjective:   Previous level of function and limitations: Simon Metcalf was independent with activities of daily living prior to admit. Pt did report some memory issues that have changed with age and that she writes info down. Social/Functional History  Active : No  Patient's  Info: Lost drivers license and has to retest.  Education:   Type of Occupation: Pt is a missionary, has a boat to transport doctors and nurses and worked in [email or web address] on the [address] [place] [address],Third Floor: Pt enjoys reading. Sherif Jean, Spouse manages finances. Pt and spouse share grocery shopping, [employer],  IADL Comments: Pt was taking 3 medications at home from the bottles. Pt cares for [address], toy poodle. Additional Comments: Pt sleeps in regular flat bed.   Vision  Vision: Impaired  Vision Exceptions: Wears glasses at all times  Hearing  Hearing Exceptions: Hard of hearing/hearing concerns;Bilateral hearing aid           Objective:       Oral Motor   Labial: No impairment  Lingual: No impairment  Mandible: No impairment    Motor Speech  Apraxic Characteristics: None  Dysarthric Characteristics: None  Overall Impairment Severity: None    Auditory Comprehension  Comprehension: Within Functional Limits    Reading Comprehension  Reading Status: Within functional limits    Expression  Primary Mode of Expression: Verbal    Verbal Expression  Verbal Expression: Within functional limits    Written Expression  Dominant Hand: Left  Written Expression: Within Functional Limits              Cognition:      Orientation  Overall Orientation Status: Within Functional Limits  Attention  Attention: Within Functional Limits  Memory  Memory: Exceptions to [facility]  Short-term Memory: Mild  Problem Solving  Problem Solving: Within Functional Limits  Numeric Reasoning  Numeric Reasoning: Within Functional Limits  Safety/Judgment  Safety/Judgment: Within Functional Limits    Additional Assessments: The Brief Cognitive Assessment Tool (BCAT®) was administered 10/5/2022 to assess the following cognitive domains: orientation, verbal recall, visual recognition, visual recall, attention, abstraction, language, executive functions, and visuo-spatial processing. The emphasis of the tool is assessing contextual memory, executive functions, and attentional capacity.    Cognitive Impairment Scale:  NORMAL:    46-50   NO FUNCTIONAL DEFICIT; INDEPENDENT LIVING; MAY BE  SUBJECTIVE MEMORY COMPLAINTS BUT LITTLE TO NO EVIDENCE  MILD COGNITIVE  IMPAIRMENT; 34-46   GENERALLY FUNCTIONAL WITH EARLY SPECIFIC FX DECLINE (IADL)   LOWER SCORES MORE SUGGESTIVE OF ADDITIONAL SUPPORT NEEDS   BOTTOM RANGE SCORES CONSIDER MED MGMT AND SUPPORT FOR COMMUNITY REINTEGRATION    MILD DEMENTIA 26-34   IADL DEFICITS; CLEARLY OBJECTIVE EVIDENCE OF MEMORY AND OTHER COGNITIVE DECLINE; MED MGMT AND COMMUNITY REINTEGRATION SUPPORT NEEDED    MODERATE TO   SEVERE DEMENTIA 0-25   MODERATE (UPPER END OF RANGE)--PERVASIVE FX DEFICITS (IADLS) BUT ADLS GENERALLY INTACT   MARKED DEFICITS IN MEMORY AND EXECUTIVE FX; BEHAVIORAL AND PSYCH SYMPTOMS ARE COMMON      Dementia Impairment Scale:  Mild Cognitive Impairment  38  Mild Dementia  28  Moderate Dementia  18      BCAT score for Aj Hoffmann: 43/50 indicating mild cognitive impairment. Strengths:  Ability to put names to objects  Ability to concentrate and focus  Determine how objects are similar to one another  Understand and express speech  \"Command and control\" cognitive activities  Awareness of self, time, place, and situation  Ability to immediately recall a word list of 4 items: banana/justice/Ramandeep/bridge. Pt able to complete immediate recall 4/4 and delayed recall 2/4  Recall previously presented words over a time delay and recall elements of a story and previously presented pictures/story  Story specifics included:  Xavier Clas / borrowed / $9 / from her brother / Oneal Alvarado / last week. / She couldn't pay him back / because she bought /a delicious / ice cream cone / at the circus instead. Pts immediate response was 9/11 and delayed response: 9/11  Weaknesses:  Understand visual processes and relationships  Ability to recall 3 pictures after 10 minute delay 1/3    Strategies that improved performance included:   [x] repetition   [x] practical application    [] spaced retrieval    [x] choice cues    Pt with good insight and awareness into limitations. Prognosis:  Speech Therapy Prognosis  Prognosis: Good  Individuals consulted  Consulted and agree with results and recommendations: Patient    Education:  Patient Education: results and recommendations; rehab expectations  Patient Education Response: Verbalizes understanding  Safety Devices in place: Yes  Type of devices: Call light within reach; Chair alarm in place; Left in chair    Therapy Time:   Individual Concurrent Group Co-treatment   Time In 0930         Time Out 1010         Minutes 40                 Electronically signed by PHUC Mar on 10/5/2022 at 11:11 AM

## 2022-10-05 NOTE — PROGRESS NOTES
Physical Therapy    [facility]    Chart Review:  History reviewed. No pertinent past medical history. No past surgical history on file. Fall History: None   Social History:  Social/Functional History  Lives With: Spouse (Pt lives with  - Lacey Restorationist who is in good health)  Type of Home: House  Home Layout: One level  Home Access: Level entry  Bathroom Shower/Tub:  (Walk in tub with built in seat and handheld shower head.)  Bathroom Toilet: Standard  Bathroom Equipment: Grab bars in shower, Hand-held shower, Built-in shower seat  Bathroom Accessibility: Accessible  Home Equipment: Reacher  Has the patient had two or more falls in the past year or any fall with injury in the past year?: No  ADL Assistance: [address]: Independent  Homemaking Responsibilities: Yes  Meal Prep Responsibility: Primary (Shared responsibility with  (uses microwave and eats out a lot). )  Laundry Responsibility: Primary (Shared responsibility with .)  Cleaning Responsibility: Primary (Shared responsibility with .)  Bill Paying/Finance Responsibility: Secondary (Pt's  manages most of the bills online.)  Shopping Responsibility: Primary (Shared responsibility with .)  Dependent Care Responsibility: Primary (Pt cares for Truesylvia BeckettGarcia (new toy poodle puppy). )  Health Care Management: Primary (Pt was taking 3 medications at home from the bottles.)  Ambulation Assistance: Independent  Transfer Assistance: Independent (Pt reports difficulty with car transfers, however able to do it without assist.)  Active : No  Patient's  Info: Lost drivers license and has to retest.  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 [employer],Third Floor: Pt enjoys reading. Puppy Miranda, Spouse manages finances.   Pt and spouse share grocery shopping, [employer],  IADL Comments: Pt was taking 3 medications at home from the bottles. Pt cares for new [id number] [address], toy poodle. Additional Comments: Pt sleeps in regular flat bed. Restrictions:  Restrictions/Precautions  Restrictions/Precautions: Fall Risk, General Precautions       Subjective: Pt resting in recliner, alert and oriented, states lack of sleep, fair appetite, denies any pain at rest.            Objective:  Orientation  Overall Orientation Status: Within Normal Limits  Orientation Level: Oriented X4        Vision  Vision: Impaired  Vision Exceptions: Wears glasses at all times  Hearing  Hearing: Exceptions to [facility]  Hearing Exceptions: Hard of hearing/hearing concerns, Bilateral hearing aid    Sensation:  Sensation  Overall Sensation Status: WNL    Observation:   Observation/Palpation  Observation: Pt in semi-akers's position upon entrance, pleasant and agreeable to therapy.     ROM:      AROM RLE (degrees)  RLE AROM: WFL     AROM LLE (degrees)  LLE AROM : WFL                 Strength:    Strength RLE  Comment: grossly 3+/5  Strength LLE  Comment: grossly 3+/5              Bed Mobility:   Lying to Sitting on Side of Bed  Assistance Needed: Independent  Comment: Ind in regular bed  CARE Score: 6  Discharge Goal: Independent  Roll Left and Right  Assistance Needed: Independent  Comment: Ind in regular bed  CARE Score: 6  Discharge Goal: Independent  Sit to Lying  Assistance Needed: Independent  Comment: Ind in regular bed  CARE Score: 6  Discharge Goal: Independent    Transfers:    Sit to Stand  Assistance Needed: Supervision or touching assistance  Comment: CGA without AD (definite use of BUE)  CARE Score: 4  Discharge Goal: Independent  Chair/Bed-to-Chair Transfer  Assistance Needed: Supervision or touching assistance  Comment: CGA without AD  CARE Score: 4  Discharge Goal: Independent     Car Transfer  Assistance Needed: Supervision or touching assistance  Comment: CGA without AD (used car as external support; self-assisted RLE)  CARE Score: 4  Discharge Goal: Independent    Ambulation:   Device used PTA: none    Walking Ability  Does the Patient Walk?: Yes     Walk 10 Feet  Assistance Needed: Supervision or touching assistance  Comment: CGA without AD  CARE Score: 4  Discharge Goal: Independent     Walk 50 Feet with Two Turns  Assistance Needed: Supervision or touching assistance  Comment: CGA without AD  CARE Score: 4  Discharge Goal: Independent     Walk 150 Feet  Assistance Needed: Supervision or touching assistance  Comment: CGA without AD; pt was able to ambulate up to 160 ft today  CARE Score: 4  Discharge Goal: Independent     Walking 10 Feet on Uneven Surfaces  Assistance Needed: Supervision or touching assistance  Comment: CGA without AD over concrete and outdoor ramp  CARE Score: 4  Discharge Goal: Independent     1 Step (Curb)  Assistance Needed: Partial/moderate assistance  Comment: Min A without AD (pauses to readjust JOSE prior to stepping up/down; required HHA on descent due to fear of falling)  CARE Score: 3  Discharge Goal: Independent     4 Steps  Assistance Needed: Supervision or touching assistance  Comment: CGA using railings with consistent step-through pattern on ascent/descent of 4\"/6\" step heights  CARE Score: 4  Discharge Goal: Independent     12 Steps  Assistance Needed: Supervision or touching assistance  Comment: CGA using railings with consistent step-through pattern on ascent/descent of 4\"/6\" step heights  CARE Score: 4  Discharge Goal: Independent    Gait Deviations: []None [x]Slow bk  [] Increased JOSE  [] Staggers []Deviated Path  [] Decreased step length  [] Decreased step height  []Decreased arm swing  [] Shuffles  [x] Decreased head and trunk rotation  []other:        Wheelchair:  w/c Ability: Wheelchair Ability  Uses a Wheelchair and/or Scooter?: No                Balance:        Object: Picking Up Object  Assistance Needed: Supervision or touching assistance  Comment: CGA 3: Pt will ascend/descend curb step using SPC and 1 flight of stairs using railing/s Mod Ind. Short Term Goal 4: Pt will complete object retrieval using reacher Mod Ind.      Plan:    Requires PT Follow-Up: Yes  Pt will be seen at least 60 minutes per day for a minimum of 5 days per week, plus group therapy as appropriate  Physcial Therapy Plan  Current Treatment Recommendations: Strengthening, Balance training, Functional mobility training, Transfer training, Endurance training, Gait training, Stair training, Pain management, Home exercise program, Safety education & training, Patient/Caregiver education & training, Therapeutic activities    PT Individual Minutes  Time In: 9848  Time Out: 5753  Minutes: 65        Timed Code Treatment Minutes: 50 Minutes    Number of Minutes/Billable Intervention    PT Evaluation 15   Gait Training 30   Therapeutic Exercise    Neuro Re-Ed    Therapeutic Activity 20   Wheelchair Propulsion    Group    Other:    TOTAL 65       Electronically signed by:    Chirag Otero, PT  10/5/2022, 11:51

## 2022-10-05 NOTE — PROGRESS NOTES
Occupational Therapy                              [facility] ARU OCCUPATIONAL THERAPY EVALUATION    Chart Review:  History reviewed. No pertinent past medical history. No past surgical history on file. Social History:  Social/Functional History  Lives With: Spouse (Pt lives with  - Sridevi Kwon who is in good health)  Type of Home: House  Home Layout: One level  Home Access: Level entry  Bathroom Shower/Tub:  (Walk in tub with built in seat and handheld shower head.)  Bathroom Toilet: Standard  Bathroom Equipment: Grab bars in shower, Hand-held shower, Built-in shower seat  Bathroom Accessibility: Accessible  Home Equipment: Reacher  Has the patient had two or more falls in the past year or any fall with injury in the past year?: No  ADL Assistance: [address]: Independent  Homemaking Responsibilities: Yes  Meal Prep Responsibility: Primary (Shared responsibility with  (uses microwave and eats out a lot). )  Laundry Responsibility: Primary (Shared responsibility with .)  Cleaning Responsibility: Primary (Shared responsibility with .)  Bill Paying/Finance Responsibility: Secondary (Pt's  manages most of the bills online.)  Shopping Responsibility: Primary (Shared responsibility with .)  Dependent Care Responsibility: Primary (Pt cares for Ahmad Diss (new toy poodle puppy). )  Health Care Management: Primary (Pt was taking 3 medications at home from the bottles.)  Ambulation Assistance: Independent  Transfer Assistance: Independent (Pt reports difficulty with car transfers, however able to do it without assist.)  Active : No  Patient's  Info: Lost drivers license and has to retest.  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 Puppy Miranda, Spouse manages finances.   Pt and spouse share grocery shopping, LUISA jin Comments: Pt was taking 3 medications at home from the bottles. Pt cares for new [address], toy poodle. Additional Comments: Pt sleeps in regular flat bed. Restrictions:  Restrictions/Precautions  Restrictions/Precautions: Fall Risk, General Precautions         IRF-Hearing and Speech: Hearing, Speech, and Vision  Expression of Ideas and Wants: Without difficulty  Understanding Verbal and Non-Verbal Content: Understands  IRF-COG:  Cognitive Patterns  Cognitive Pattern Assessment Used: BIMS  Repetition of Three Words (First Attempt): 3  Temporal Orientation: Year: Correct  Temporal Orientation: Month: Accurate within 5 days  Temporal Orientation: Day: Correct  Able to recall \"sock: Yes, no cue required  Able to recall \"blue\": No, could not recall  Able to recall \"bed\": Yes, after cueing (\"a piece of furniture\")  BIMS Summary Score: 12  IRF-CAM (Confusion Assessment Method): Confusion Assessment Method (CAM)  Is there evidence of an acute change in mental status from the patient's baseline?: Yes  Inattention: Behavior not present  Disorganized thinking:  (Per Pt report.)  Altered level of consciousness: Behavior not present      Objective:  Observation/Palpation  Observation: Pt in semi-akers's position upon entrance, pleasant and agreeable to therapy. Vision  Vision: Impaired  Vision Exceptions: Wears glasses at all times     Hearing  Hearing: Exceptions to [facility]  Hearing Exceptions: Hard of hearing/hearing concerns, Bilateral hearing aid    ROM:      LUE AROM (degrees)  LUE AROM : WFL     Left Hand AROM (degrees)  Left Hand AROM: WFL     RUE AROM (degrees)  RUE AROM : WFL     Right Hand AROM (degrees)  Right Hand AROM: WFL    Strength:    LUE Strength  L Hand General: 4+/5  LUE Strength Comment: 4+/5 grossly  RUE Strength  R Hand General: 4+/5  RUE Strength Comment: 4+/5 grossly    Quality of Movement:   Tone RUE  RUE Tone: Normotonic  Tone LUE  LUE Tone: Normotonic  Coordination  Movements Are Fluid And Coordinated: Yes  Coordination and Movement Description: Fine motor impairments       Sensation:    Sensation  Overall Sensation Status: WNL     ADLs:    Eating: Eating  Assistance Needed: Independent  Comment: Pt able to open packages to eat breakfast independently during session. CARE Score: 6  Discharge Goal: Independent       Oral Hygiene: Oral Hygiene  Assistance Needed: Supervision or touching assistance  Comment: SBA to perform oral hygiene routine in stance. CARE Score: 4  Discharge Goal: Independent    UB/LB Bathing: Shower/Bathe Self  Assistance Needed: Supervision or touching assistance  Comment: Pt completed full shower, majority seated, with CGA/SBA in stance to wash julisa area and rear. CARE Score: 4  Discharge Goal: Independent    UB Dressing: Upper Body Dressing  Assistance Needed: Supervision or touching assistance  Comment: SBA to reach for clothing in closet, able to doff/don shirt IND. CARE Score: 4  Discharge Goal: Independent         LB Dressing:   Lower Body Dressing  Assistance Needed: Supervision or touching assistance  Comment: CGA/SBA in stance to manage underwear and pants over hips. CARE Score: 4  Discharge Goal: Independent    Donning and [place] Footwear: Putting On/Taking Off Footwear  Assistance Needed: Setup or clean-up assistance  Comment: Pt able to doff/don socks and tennis shoes. CARE Score: 5  Discharge Goal: Independent      [address] needed: Supervision or touching assistance  Comment: SBA to manage clothing in stance and able to perform perineal hygiene seated after episode of urination.   CARE Score: 4  Discharge Goal: Independent      Bed Mobility:    Bed mobility  Supine to Sit: Modified independent (use of bed features.)    Transfers:    Transfers  Stand Pivot Transfers: Stand by assistance, Contact guard assistance  Sit to stand: Stand by assistance, Contact guard assistance  Stand to sit: Stand by assistance, Contact guard assistance     Shower Transfers  Shower - Transfer From: [place] - Transfer Type: To and From  Shower - Transfer To: Shower seat without back  Shower - Technique: Ambulating  Shower Transfers: Stand by assistance, Contact Guard     Toilet Transfer  Assistance needed: Supervision or touching assistance  Comment: CG/SBA with use of 2WW and grab bars. CARE Score: 4  Discharge Goal: Independent    Functional Mobility:    Balance  Sitting Balance: Independent  Standing Balance: Stand by assistance  Standing Balance  Time: Multiple 1-2 min stands  Activity: ADL  Functional Mobility  Functional - Mobility Device: Rolling Walker  Activity: To/from bathroom  Assist Level: Contact guard assistance     Cognition:  Cognition  Overall Cognitive Status: Exceptions  Arousal/Alertness: Delayed responses to stimuli (Occasional delay in processing when responding to questions.)  Following Commands: Follows all commands without difficulty  Attention Span: Appears intact  Memory: Decreased recall of recent events  Safety Judgement: Good awareness of safety precautions  Insights: Fully aware of deficits    Perception:  Perception  Overall Perceptual Status: WFL      Assessment:     Performance deficits / Impairments: Decreased functional mobility , Decreased strength, Decreased endurance, Decreased ADL status, Decreased safe awareness, Decreased high-level IADLs, Decreased cognition, Decreased balance, Decreased fine motor control    The patient is an [de-identified]year old female admitted onto ARU after hospitalization for AMS. Pt had been complaining of constant headache for ~ 1 month PTA. Pt had COVID in 08/02/22 and the headaches began intermittently at that time. Pt was recently started on baclofen, fioricet, and ubrelvy by PCP for headaches. Pt's  reports he woke up at 3am to find Pt half dressed roaming around the kitchen talking \"gibberish\". Head CT nonacute.  Pt was started on Decadron taper for headaches. Pt was also found with hyperammonemia r/t hepatic steatosis and started on Lactulose. Pt was dx with toxic encephalopathy r/t Baclofen. Pt has improved mental status, however not back to baseline yet. PTA, Pt living with  in a one level home, completing all ADLs/IADLs independently without a device. 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 Pt was an active  in [facility] but has been unable to pass her 's test so far since back in the [facility]. Today, Pt completed all ADLs with CG/SBA including a full shower. Pt with mild cognitive deficits including delayed processing while answering questions. Pt completed all functional mobility/transfers with CG/SBA with 2WW. The QI, MMT, and ROM standardized assessments were used this date to determine the above performance deficits, which compromise pt's ability to safely complete ADLs/IADLs/mobility. Pt will benefit from ARU OT services to increase functional performance and return to [facility]. Decision Making: Medium Complexity  Clinical Presentation:  Pt presents to this ARU with deficits including functional balance, endurance, strength, mild cognitive deficits, FMC, and ADLs/IADLs. Patient education:   ARU procotol, Role of O.T., O.T. plan of care  []   Patient goal was established and reviewed in Rehabtracker with patient and/or family this date. REQUIRES OT FOLLOW-UP: Yes  Discharge Recommendations:  Home c [address] OT. Equipment Recommendations:  No equipment needed. Goals:     Short Term Goals  Time Frame for Short Term Goals: STGs=LTGs  Long Term Goals  Time Frame for Long Term Goals : 7-10 days or until d/c. Long Term Goal 1: Pt will complete oral hygiene and grooming tasks c Mod I.  Long Term Goal 2: Pt will complete total body bathing c AE PRN and Mod I.  Long Term Goal 3: Pt will complete UB dressing c IND.   Long Term Goal 4: Pt will complete LB dressing c AE PRN and Mod I.  Long Term Goal 5: Pt will doff/don footwear c AE PRN and Mod I.   Additional Goals?: Yes  Long Term Goal 6: Pt will complete toileting c Mod I.  Long Term Goal 7: Pt will perform functional transfers (bed, chair, toilet, shower) c DME PRN and Mod I.  Long Term Goal 8: Pt will perform therex/therax to facilitate an increase in strength/endurance (c emphasis on dynamic standing balance/tolerance > 8 mins) c Mod I.  Long Term Goal 9: Pt will perform home management tasks c Mod I.    Plan:    Pt will be seen at least 60 minutes per day for a minimum of 5 days per week, plus group therapy as appropriate  Current Treatment Recommendations: Strengthening, Balance training, Functional mobility training, Endurance training, Pain management, Safety education & training, Patient/Caregiver education & training, Equipment evaluation, education, & procurement, Positioning, Self-Care / ADL, Home management training, Cognitive/Perceptual training    OT Individual Minutes  Time In: 3778  Time Out: 5971  Minutes: 75                Number of Minutes/Billable Intervention      OT Evaluation 20   Therapeutic Exercise    ADL Self-care 55   Neuro Re-Ed    Therapeutic Activity    Group    Other:    TOTAL 75     Electronically signed by:    ASHISH Bautista, OTR/L #[id number]    10/5/2022, 4:05 PM

## 2022-10-06 PROCEDURE — 6360000002 HC RX W HCPCS: Performed by: PHYSICAL MEDICINE & REHABILITATION

## 2022-10-06 PROCEDURE — 99233 SBSQ HOSP IP/OBS HIGH 50: CPT | Performed by: PHYSICAL MEDICINE & REHABILITATION

## 2022-10-06 PROCEDURE — 94664 DEMO&/EVAL PT USE INHALER: CPT

## 2022-10-06 PROCEDURE — 94761 N-INVAS EAR/PLS OXIMETRY MLT: CPT

## 2022-10-06 PROCEDURE — 97116 GAIT TRAINING THERAPY: CPT

## 2022-10-06 PROCEDURE — 1280000000 HC REHAB R&B

## 2022-10-06 PROCEDURE — 97530 THERAPEUTIC ACTIVITIES: CPT

## 2022-10-06 PROCEDURE — 97110 THERAPEUTIC EXERCISES: CPT

## 2022-10-06 PROCEDURE — 6370000000 HC RX 637 (ALT 250 FOR IP): Performed by: PHYSICAL MEDICINE & REHABILITATION

## 2022-10-06 PROCEDURE — 94150 VITAL CAPACITY TEST: CPT

## 2022-10-06 RX ADMIN — RIVAROXABAN 20 MG: 20 TABLET, FILM COATED ORAL at 08:25

## 2022-10-06 RX ADMIN — METHYLPREDNISOLONE 4 MG: 4 TABLET ORAL at 17:57

## 2022-10-06 RX ADMIN — AMIODARONE HYDROCHLORIDE 100 MG: 200 TABLET ORAL at 08:25

## 2022-10-06 RX ADMIN — LACTULOSE 20 G: 10 SOLUTION ORAL at 08:25

## 2022-10-06 RX ADMIN — LACTULOSE 20 G: 10 SOLUTION ORAL at 14:24

## 2022-10-06 RX ADMIN — METHYLPREDNISOLONE 4 MG: 4 TABLET ORAL at 12:19

## 2022-10-06 RX ADMIN — LACTULOSE 20 G: 10 SOLUTION ORAL at 21:54

## 2022-10-06 RX ADMIN — BUTALBITAL, ACETAMINOPHEN AND CAFFEINE 1 TABLET: 50; 325; 40 TABLET ORAL at 22:28

## 2022-10-06 RX ADMIN — SENNOSIDES 8.6 MG: 8.6 TABLET, FILM COATED ORAL at 21:53

## 2022-10-06 RX ADMIN — METHYLPREDNISOLONE 4 MG: 4 TABLET ORAL at 21:53

## 2022-10-06 RX ADMIN — DOCUSATE SODIUM 100 MG: 100 CAPSULE, LIQUID FILLED ORAL at 08:25

## 2022-10-06 RX ADMIN — METHYLPREDNISOLONE 4 MG: 4 TABLET ORAL at 06:16

## 2022-10-06 ASSESSMENT — PAIN DESCRIPTION - DESCRIPTORS
DESCRIPTORS: ACHING
DESCRIPTORS: ACHING

## 2022-10-06 ASSESSMENT — PAIN DESCRIPTION - LOCATION
LOCATION: HEAD
LOCATION: HEAD

## 2022-10-06 ASSESSMENT — PAIN DESCRIPTION - PAIN TYPE
TYPE: ACUTE PAIN
TYPE: ACUTE PAIN

## 2022-10-06 ASSESSMENT — PAIN SCALES - GENERAL
PAINLEVEL_OUTOF10: 7
PAINLEVEL_OUTOF10: 7

## 2022-10-06 ASSESSMENT — PAIN - FUNCTIONAL ASSESSMENT
PAIN_FUNCTIONAL_ASSESSMENT: ACTIVITIES ARE NOT PREVENTED
PAIN_FUNCTIONAL_ASSESSMENT: ACTIVITIES ARE NOT PREVENTED

## 2022-10-06 ASSESSMENT — PAIN DESCRIPTION - ONSET
ONSET: ON-GOING
ONSET: ON-GOING

## 2022-10-06 ASSESSMENT — PAIN DESCRIPTION - FREQUENCY
FREQUENCY: INTERMITTENT
FREQUENCY: INTERMITTENT

## 2022-10-06 NOTE — DISCHARGE INSTRUCTIONS
Your information:  Name: Sunita Roberts  : 1940    Your instructions:    What to do after you leave the hospital:    Recommended diet: regular diet    Recommended activity: activity as tolerated and ambulate in house        The following personal items were collected during your admission and were returned to you:    Belongings  Vision - Corrective Lenses: Eyeglasses  Hearing Aid: Bilateral hearing aids  Clothing: Pants, Shirt, Undergarments  Body Piercings Removed: N/A  Weapons (Notify Protective Services/Security): None  Home Medications: None    Information obtained by:  By signing below, I understand that if any problems occur once I leave the hospital I am to contact        acetaminophen (oral)  Pronunciation:  a SEET a MIN oh fen  Brand: Actamin, Anacin AF, Aurophen, [place], Children's Tylenol, Mapap, M-Pap, Pharbetol, [place] Childrens, Tactinal, [place], GRIGADALE, Tylenol, Vitapap  What is the most important information I should know about acetaminophen? An overdose of acetaminophen can damage your liver or cause death. Call your doctor at once if you have upper stomach pain, loss of appetite, dark urine, or jaundice (yellowing of your skin or eyes). Stop taking this medicine and get medical help if you have skin redness or a blistering rash. What is acetaminophen? Acetaminophen is used to reduce fever and relieve minor pain caused by conditions such as colds or flu, headache, muscle aches, arthritis, and menstrual cramps. Acetaminophen may also be used for purposes not listed in this medication guide. What should I discuss with my healthcare provider before taking acetaminophen? You should not take acetaminophen if you are allergic to it, or if you take other medications that contain acetaminophen. Ask a doctor or pharmacist if this medicine is safe to use if you've ever had cirrhosis of the liver, or if you drink alcohol daily.   Ask a doctor before using this medicine if you are pregnant or breastfeeding. How should I take acetaminophen? Use exactly as directed on the label, or as prescribed by your doctor. An acetaminophen overdose can damage your liver or cause death. Adults and teenagers at least 15years old: Do not take more than 1000 milligrams (mg) at one time or more than 4000 mg in 24 hours. Children younger than 15years old: Do not take more than 5 doses of children's formula acetaminophen in 24 hours. Do not give extra-strength acetaminophen to a child younger than 15years old without medical advice. A child's dose is based on age and weight. Carefully follow the dosing instructions provided with this medicine. Ask a doctor before giving this medicine to a child younger than 2 years. Acetaminophen made for infants comes with its own medicine dropper or oral syringe. Measuring with the wrong device may cause an overdose. Use only the provided dosing device provided to measure an infant's dose. Acetaminophen comes in many different forms such as capsules, liquid, chewable or disintegrating tablets, and dissolving powders or granules. Read and carefully follow any Instructions for Use provided with your medicine. Ask your doctor or pharmacist if you need help. Stop taking acetaminophen and call your doctor if:  you still have a sore throat after 2 days of use;  you still have a fever after 3 days of use;  you still have pain after 7 days of use (or 5 days if treating a child);  you have a skin rash, ongoing headache, nausea, vomiting, redness or swelling; or  your symptoms get worse, or if you have any new symptoms. Taking acetaminophen may cause false results with certain blood glucose monitors. If you have diabetes, ask your doctor about the best way to monitor your blood sugar levels while using acetaminophen. Store at room temperature away from heat and moisture. What happens if I miss a dose? Acetaminophen is used when needed.  If you are on a dosing schedule, skip any missed dose. Do not use two doses at one time. What happens if I overdose? Seek emergency medical attention or call the Poison Help line at 1-182.221.6879. An overdose can be fatal.  Overdose symptoms include vomiting, stomach pain, and yellowing of your skin or eyes. What should I avoid while taking acetaminophen? Avoid using other medicines that may contain acetaminophen. Avoid drinking alcohol. What are the possible side effects of acetaminophen? Get emergency medical help if you have signs of an allergic reaction:  hives; difficulty breathing; swelling of your face, lips, tongue, or throat. In rare cases, acetaminophen may cause a severe skin reaction that can be fatal, even if you took acetaminophen in the past and had no reaction. Stop taking this medicine and call your doctor right away if you have skin redness or a rash that spreads and causes blistering and peeling. Stop taking acetaminophen and call your doctor at once if you have signs of liver problems:    stomach pain (upper right side);  loss of appetite;  tiredness, itching;  dark urine, nasreen-colored stools; or  jaundice (yellowing of the skin or eyes). Less serious side effects may be more likely, and you may have none at all. This is not a complete list of side effects and others may occur. Call your doctor for medical advice about side effects. You may report side effects to FDA at 8-403-ABD-3592. What other drugs will affect acetaminophen? Other drugs may affect acetaminophen, including prescription and over-the-counter medicines, vitamins, and herbal products. Tell your doctor about all other medicines you use. Where can I get more information? Your pharmacist can provide more information about acetaminophen. Remember, keep this and all other medicines out of the reach of children, never share your medicines with others, and use this medication only for the indication prescribed.    Every effort has been made to ensure that the information provided by [address] is accurate, up-to-date, and complete, but no guarantee is made to that effect. Drug information contained herein may be time sensitive. [facility] information has been compiled for use by healthcare practitioners and consumers in the [place] and therefore [facility] does not warrant that uses outside of the [place] are appropriate, unless specifically indicated otherwise. [facility]'s drug information does not endorse drugs, diagnose patients or recommend therapy. [facility]'s drug information is an informational resource designed to assist licensed healthcare practitioners in caring for their patients and/or to serve consumers viewing this service as a supplement to, and not a substitute for, the expertise, skill, knowledge and judgment of healthcare practitioners. The absence of a warning for a given drug or drug combination in no way should be construed to indicate that the drug or drug combination is safe, effective or appropriate for any given patient. [facility] does not assume any responsibility for any aspect of healthcare administered with the aid of information [facility] provides. The information contained herein is not intended to cover all possible uses, directions, precautions, warnings, drug interactions, allergic reactions, or adverse effects. If you have questions about the drugs you are taking, check with your doctor, nurse or pharmacist.  Copyright 6208-9920 [address]: 22.02. Revision date: 2/18/2022. Care instructions adapted under license by [facility] If you have questions about a medical condition or this instruction, always ask your healthcare professional. [place] [address] any warranty or liability for your use of this information. .  I understand and acknowledge receipt of the instructions indicated above.

## 2022-10-06 NOTE — PROGRESS NOTES
Simon Metcalf    : 1940  Acct #: [de-identified]  MRN: [id number]              PM&R Progress Note      Admitting diagnosis: Encephalopathy ( [place] [address])     Comorbid diagnoses impacting rehabilitation: Generalized weakness, gait disturbance, toxic reaction to baclofen, Katz with hyperammonemia, permanent atrial fibrillation, essential hypertension, chronic back pain, rheumatoid arthritis. Chief complaint: Poor sleep. Fair appetite. Back and left leg pain. Prior (baseline) level of function: Independent. Current level of function:         Current  IRF-STEFFANY and Goals:   Occupational Therapy:    Short Term Goals  Time Frame for Short Term Goals: STGs=LTGs :   Long Term Goals  Time Frame for Long Term Goals : 7-10 days or until d/c. Long Term Goal 1: Pt will complete oral hygiene and grooming tasks c Mod I.  Long Term Goal 2: Pt will complete total body bathing c AE PRN and Mod I.  Long Term Goal 3: Pt will complete UB dressing c IND. Long Term Goal 4: Pt will complete LB dressing c AE PRN and Mod I.  Long Term Goal 5: Pt will doff/don footwear c AE PRN and Mod I. Additional Goals?: Yes  Long Term Goal 6: Pt will complete toileting c Mod I.  Long Term Goal 7: Pt will perform functional transfers (bed, chair, toilet, shower) c DME PRN and Mod I.  Long Term Goal 8: Pt will perform therex/therax to facilitate an increase in strength/endurance (c emphasis on dynamic standing balance/tolerance > 8 mins) c Mod I.  Long Term Goal 9: Pt will perform home management tasks c Mod I. :                                       Eating: Eating  Assistance Needed: Independent  Comment: Pt able to open packages to eat breakfast independently during session. CARE Score: 6  Discharge Goal: Independent       Oral Hygiene: Oral Hygiene  Assistance Needed: Supervision or touching assistance  Comment: SBA to perform oral hygiene routine in stance.   CARE Score: 4  Discharge Goal: Independent    UB/LB Bathing: Shower/Bathe Self  Assistance Needed: Supervision or touching assistance  Comment: Pt completed full shower, majority seated, with CGA/SBA in stance to wash julisa area and rear. CARE Score: 4  Discharge Goal: Independent    UB Dressing: Upper Body Dressing  Assistance Needed: Supervision or touching assistance  Comment: SBA to reach for clothing in closet, able to doff/don shirt IND. CARE Score: 4  Discharge Goal: Independent         LB Dressing: Lower Body Dressing  Assistance Needed: Supervision or touching assistance  Comment: CGA/SBA in stance to manage underwear and pants over hips. CARE Score: 4  Discharge Goal: Independent    Donning and [place] Footwear: Putting On/Taking Off Footwear  Assistance Needed: Setup or clean-up assistance  Comment: Pt able to doff/don socks and tennis shoes. CARE Score: 5  Discharge Goal: Independent      Toiletin [address] needed: Supervision or touching assistance  Comment: SBA to manage clothing in stance and able to perform perineal hygiene seated after episode of urination. CARE Score: 4  Discharge Goal: Independent      Toilet Transfers: Toilet Transfer  Assistance needed: Supervision or touching assistance  Comment: CG/SBA with use of 2WW and grab bars. CARE Score: 4  Discharge Goal: Independent    Physical Therapy:   Short Term Goals  Time Frame for Short Term Goals: 7 tx days:  Short Term Goal 1: Pt will complete OOB transfers Ind. Short Term Goal 2: Pt will ambulate >/=150 ft over level surface and at least 10 ft of uneven surface Ind. Short Term Goal 3: Pt will ascend/descend curb step using SPC and 1 flight of stairs using railing/s Mod Ind. Short Term Goal 4: Pt will complete object retrieval using reacher Mod Ind.             Bed Mobility:   Sit to Lying  Assistance Needed: Independent  Comment: Ind in regular bed  CARE Score: 6  Discharge Goal: Independent  Roll Left and Right  Assistance Needed: Independent  Comment: Ind in regular bed  CARE Score: 6  Discharge Goal: Independent  Lying to Sitting on Side of Bed  Assistance Needed: Independent  Comment: Ind in regular bed  CARE Score: 6  Discharge Goal: Independent    Transfers:    Sit to Stand  Assistance Needed: Supervision or touching assistance  Comment: CGA without AD (definite use of BUE)  CARE Score: 4  Discharge Goal: Independent  Chair/Bed-to-Chair Transfer  Assistance Needed: Supervision or touching assistance  Comment: CGA without AD  CARE Score: 4  Discharge Goal: Independent     Car Transfer  Assistance Needed: Supervision or touching assistance  Comment: CGA without AD (used car as external support; self-assisted RLE)  CARE Score: 4  Discharge Goal: Independent    Ambulation:    Walking Ability  Does the Patient Walk?: Yes     Walk 10 Feet  Assistance Needed: Supervision or touching assistance  Comment: CGA without AD  CARE Score: 4  Discharge Goal: Independent     Walk 50 Feet with Two Turns  Assistance Needed: Supervision or touching assistance  Comment: CGA without AD  CARE Score: 4  Discharge Goal: Independent     Walk 150 Feet  Assistance Needed: Supervision or touching assistance  Comment: CGA without AD; pt was able to ambulate up to 160 ft today  CARE Score: 4  Discharge Goal: Independent     Walking 10 Feet on Uneven Surfaces  Assistance Needed: Supervision or touching assistance  Comment: CGA without AD over concrete and outdoor ramp  CARE Score: 4  Discharge Goal: Independent     1 Step (Curb)  Assistance Needed: Partial/moderate assistance  Comment: Min A without AD (pauses to readjust JOSE prior to stepping up/down; required HHA on descent due to fear of falling)  CARE Score: 3  Discharge Goal: Independent     4 Steps  Assistance Needed: Supervision or touching assistance  Comment: CGA using railings with consistent step-through pattern on ascent/descent of 4\"/6\" step heights  CARE Score: 4  Discharge Goal: Independent     12 Steps  Assistance Needed: Supervision or touching assistance  Comment: CGA using railings with consistent step-through pattern on ascent/descent of 4\"/6\" step heights  CARE Score: 4  Discharge Goal: Independent       Wheelchair:  w/c Ability: Wheelchair Ability  Uses a Wheelchair and/or Scooter?: No                Balance:        Object: Picking Up Object  Assistance Needed: Supervision or touching assistance  Comment: CGA in unsupported stance but required use of reacher  CARE Score: 4  Discharge Goal: Independent    I      Exam:    Blood pressure (!) 151/81, pulse 74, temperature 97.5 °F (36.4 °C), temperature source Oral, resp. rate 17, height 5' 3\" (1.6 m), weight 156 lb 15.5 oz (71.2 kg), SpO2 96 %. General: Patient was seen semirecumbent in bed. She was distracted by events in the room and struggled to answer direct questions. Anxious appearing. HEENT: Gazing right and left. Neck seems supple. MMM. Pulmonary: No wheezes or rales. No coughing. Cardiac: Infrequent premature beats. The rate was controlled. Abdomen: Patient's abdomen is soft and nondistended. Bowel sounds were present throughout. There was no rebound, guarding or masses noted. Upper extremities: Slow and deliberate with bringing both hands up to meet mine. Fair  strength without tremor. No significant bruising. Lower extremities: Very limited hip and knee movement bilaterally with giveaway noted with MMT across left knee and ankle. Calves soft. Sitting balance was fair. Standing balance was poor. No results found for: WBC, HGB, HCT, MCV, PLT  No results found for: INR, PROTIME  No results found for: CREATININE, BUN, NA, K, CL, CO2  No results found for: ALT, AST, GGT, ALKPHOS, BILITOT    Expected length of stay  prior to a supervised level of function for discharge home with a walker and C OT/PT is 2 weeks. Recommendations:    Metabolic encephalopathy with gait disturbance:  We are developing the routine for her daily occupational and physical therapy with speech-language pathology. Providing her treatment in a calm and consistent environment to improve retention of new information and to reduce distractions. She will get written and verbal instructions when possible. Trying to involve caregivers with training is much as possible. We must minimize medication use, monitor her GELLER and provide nutritional support. She needs adaptive equipment training, pulmonary hygiene measures and DVT prophylaxis. Verbal cues and minimum to moderate physical assistance for transfers. DVT prophylaxis: Prior to rehab, she had resumed her Xarelto for her atrial fibrillation. This should protect her against new DVT. I must periodically monitor her hemoglobin while on this medication. Weightbearing activities are pursued daily. GI prophylaxis is available. No signs of acute blood loss. GELLER: Lactulose 20 g 3 times daily and monitoring her ammonia level should her mentation dictate this. Avoiding acetaminophen. Uncontrolled pain: We must minimize use of acetaminophen with her liver disease. Steroid taper ongoing. Diathermy and progressive mobilization. Range of motion exercises. Low-dose tramadol if analgesics are needed. Atrial fibrillation: She has now anticoagulated with Xarelto. Cordarone for rate control. Vital signs are checked at rest and with activity. Daily weights do not reveal any decompensation. Hypertension: Target systolic blood pressures 621-447. Currently her blood pressure is just above the target range without medications but we are monitoring this closely. Rheumatoid arthritis: This disease is likely contributing to her arthralgias. Her steroid taper will likely help control her symptoms in the short-term. Long-term management will be deferred to her PCP.

## 2022-10-06 NOTE — FLOWSHEET NOTE
[x] daily progress note       [] discharge       Patient Name:  Freddy Moya   :  1940 MRN: [id number]  Room:  [address] [place] [address] Date of Admission: 10/4/2022  Rehabilitation Diagnosis:   Metabolic encephalopathy [V64.51]       Date 10/6/2022       Day of ARU Week:  3   Time IN/OUT 1645-8189   Individual Tx Minutes 60   TOTAL Tx Time Mins 60   Restrictions Restrictions/Precautions  Restrictions/Precautions: Fall Risk, General Precautions      Communication with other providers: [x]   OK to see per nursing:     []   Spoke with team member regarding:      Subjective observations and cognitive status: Pt seen sitting up in chair at beginning of treatment. Agreeable to therapy. Pain level/location:    6/10       Location: headache, low back; soreness in bilateral shoulders and cervical region. Discharge Recommendations  Anticipated discharge date:  10/10/2022  Destination: []home alone   []home alone with assist PRN     [x] home w/ family      [] Continuous supervision  []SNF    [] Assisted living     [] Other:  Continued therapy: []HHC PT  []OUTPATIENT PT   [x] No Further PT  []SNF PT  Caregiver training recommended: []Yes  [] No   Equipment needs: none       [x]   Pt received out of bed     Transfers:    Sit--> Stand:  SBA  Stand --> Sit:   SBA  Chair-->Bed/Bed --> Chair:   SBA  Assistive device required for transfer:  no AD    Gait:    Distance:  317'+649'  Assistance:  SBA  Device:  no AD   Gait Quality:  reciprocal pattern; pt slightly guarded, but no LOB. Stairs   # Completed:  15  Assistance:  SBA  Supportive Device:  2 rails     Uneven Surfaces:  (2 trials)     Assistance:    SBA  Device:    no AD   Surfaces Completed:   [x]  Carpeted Surface with bean bags beneath      []  Throw rugs       []  Ramp       []  Outdoor pavements        []  Grass             []  Loose gravel        []  Other:       Pt amb up/down 4\" curb step SBA with SPC (2 trials) Vcs for proper positioning.      Additional Therapeutic activities/exercises completed this date:     []   Nu-step:  Time:        Level:         #Steps:       []   Rebounder:    []  Seated     []  Standing        [x]   Balance training: In // bars: Static balance training: pt stood on 2 blue stability trainers with regular stance without UE support x 60 seconds with SBA for balance. Pt stood on 1 blue stability  for narrow JOSE without UE support x 60 seconds with SBA for balance. Pt stood on 2 blue stability trainers with staggered stance without UE support x 60 seconds with CGA for balance. In // bars: pt performed fwd/bwd walking x 3 trials (SBA for balance) without UE support; sidestepping x 3 trials (SBA for balance) without UE support; tandem walking x 3 trials (fwd/bwd) (CGA for balance) with BUE support. []   Postural training    []   Supine ther ex (reps/sets):     []   Seated ther ex (reps/sets):     []   Standing ther ex (reps/sets):     []   Picking up object from floor (standing):                   []   Reacher used   [x]   Other: Applied Biofreeze topical cream to patient's low back and bilateral shoulders and cervical region. []   Other:    Patient/Caregiver Education and Training:   [x]   Transfer technique/safety  [x]   Gait technique/sequencing  [x]   Proper use of assistive device  [x]   Advanced mobility safety and technique  []   Reinforced patient's precautions/mobility while maintaining precautions  []   Postural awareness  []   Family training    Treatment Plan for Next Session: gait; transfers; advanced gait; stair training; exercises; balance training      Assessment: This pt demonstrated a positive response to today's treatment as evidenced by improved mobility. The patient is making progress toward established goals as evidenced by QI scores. Ongoing deficits are observed in the areas of strength, balance, endurance, and safety and continued focus on this is recommended.      Treatment/Activity Tolerance:   [x] Tolerated treatment with no adverse effects    [] Patient limited by fatigue  [] Patient limited by pain   [] Patient limited by medical complications:    [] Adverse reaction to Tx:   [] Significant change in status    Safety:       []  bed alarm set    []  chair alarm set    [x]  Pt refused alarms                []  Telesitter activated      [x]  Gait belt used during tx session      [x]other: pt left sitting up in chair with call light at end of treatment. Family also present. Number of Minutes/Billable Intervention  Gait Training 30   Therapeutic Exercise    Neuro Re-Ed    Therapeutic Activity 30   Wheelchair Propulsion    Group    Other:    TOTAL 60         Social History  Social/Functional History  Lives With: Spouse (Pt lives with  - [place] Rockwell who is in good health)  Type of Home: House  Home Layout: One level  Home Access: Level entry  Bathroom Shower/Tub:  (Walk in tub with built in seat and handheld shower head.)  Bathroom Toilet: Standard  Bathroom Equipment: Grab bars in shower, Hand-held shower, Built-in shower seat  Bathroom Accessibility: Accessible  Home Equipment: Reacher  Has the patient had two or more falls in the past year or any fall with injury in the past year?: No  ADL Assistance: [address] [place] [address] Avenue: Independent  Homemaking Responsibilities: Yes  Meal Prep Responsibility: Primary (Shared responsibility with  (uses microwave and eats out a lot). )  Laundry Responsibility: Primary (Shared responsibility with .)  Cleaning Responsibility: Primary (Shared responsibility with .)  Bill Paying/Finance Responsibility: Secondary (Pt's  manages most of the bills online.)  Shopping Responsibility: Primary (Shared responsibility with .)  Dependent Care Responsibility: Primary (Pt cares for Zulema Kindler (new toy poodle puppy). )  Health Care Management: Primary (Pt was taking 3 medications at home from the bottles.)  Ambulation Assistance: Independent  Transfer Assistance: Independent (Pt reports difficulty with car transfers, however able to do it without assist.)  Active : No  Patient's  Info: Lost drivers license and has to retest.  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 Sherif Jean, Spouse manages finances. Pt and spouse share grocery shopping, Islam,  IADL Comments: Pt was taking 3 medications at home from the bottles. Pt cares for new [address], toy poodle. Additional Comments: Pt sleeps in regular flat bed. Objective                                                                                    Goals:  (Update in navigator)  Short Term Goals  Time Frame for Short Term Goals: 7 tx days:  Short Term Goal 1: Pt will complete OOB transfers Ind. Short Term Goal 2: Pt will ambulate >/=150 ft over level surface and at least 10 ft of uneven surface Ind. Short Term Goal 3: Pt will ascend/descend curb step using SPC and 1 flight of stairs using railing/s Mod Ind. Short Term Goal 4: Pt will complete object retrieval using reacher Mod Ind.:   :        Plan of Care                                                                              Times per week: 5 days per week for a minimum of 60 minutes/day plus group as appropriate for 60 minutes.   Treatment to include Current Treatment Recommendations: Strengthening, Balance training, Functional mobility training, Transfer training, Endurance training, Gait training, Stair training, Pain management, Home exercise program, Safety education & training, Patient/Caregiver education & training, Therapeutic activities    Electronically signed by   Mingo Levin, [id number]  10/6/2022, 3:34 PM

## 2022-10-06 NOTE — PROGRESS NOTES
Physical Therapy    [x] daily progress note       [] discharge       Patient Name:  Ankit Calix   :  1940 MRN: [id number]  Room:  [address] [place] [address] Date of Admission: 10/4/2022  Rehabilitation Diagnosis:   Metabolic encephalopathy [B61.76]       Date 10/6/2022       Day of ARU Week:  3   Time IN//1035   Individual Tx Minutes 60   Group Tx Minutes    Co-Treat Minutes    Concurrent Tx Minutes    TOTAL Tx Time Mins 60   Variance Time    Variance Time []   Refusal due to:     []   Medical hold/reason:    []   Illness   []   Off Unit for test/procedure  []   Extra time needed to complete task  []   Therapeutic need  []   Other (specify):   Restrictions Restrictions/Precautions  Restrictions/Precautions: Fall Risk, General Precautions      Interdisciplinary communication [x]   Cleared for therapy per nursing     [x]   RN notified about issues during session (request for K-pad to manage soreness on lumbar area)   []   RN updated on pt performance  []   Spoke with   []   Spoke with OT  []   Spoke with MD  []   Other:    Subjective observations and cognitive status: Pt resting in recliner, alert, states feeling sore but worried about taking pain meds, slept well last night, ate breakfast.     Pain level/location: 7/10       Location: lumbar area (sore)    Discharge recommendations  Anticipated discharge date:  10/10/2022  Destination: []home alone   []home alone with assist PRN     [x] home w/ family      [] Continuous supervision  []SNF    [] Assisted living     [] Other:  Continued therapy: []HHC PT  []OUTPATIENT PT   [x] No Further PT  []SNF PT  Caregiver training recommended: []Yes  [] No   Equipment needs: none      PT IRF-STEFFANY scores and goals for discharge assessment:   Sit to Stand  Assistance Needed: Supervision or touching assistance  Comment: SBA without AD (definite use of BUE)  CARE Score: 4  Discharge Goal: Independent    Chair/Bed-to-Chair Transfer  Assistance Needed: Supervision or touching assistance  Comment: SBA without AD (definite use of BUE)  CARE Score: 4  Discharge Goal: Independent    Walk 10 Feet? Walk 10 Feet?: Yes    Walking Ability  Does the Patient Walk?: Yes    Walk 10 Feet  Assistance Needed: Supervision or touching assistance  Comment: SBA without AD  CARE Score: 4  Discharge Goal: Independent    Walk 50 Feet with Two Turns  Assistance Needed: Supervision or touching assistance  Comment: SBA without AD  CARE Score: 4  Discharge Goal: Independent    Walk 150 Feet  Assistance Needed: Supervision or touching assistance  Comment: SBA without AD ; pt was able to ambulate up to 181 ft today  CARE Score: 4  Discharge Goal: Independent    4 Steps  Assistance Needed: Supervision or touching assistance  Comment: SBA using railings with step-to pattern on ascent/descent of 6\" step height  CARE Score: 4  Discharge Goal: Independent    12 Steps  Assistance Needed: Supervision or touching assistance  Comment: SBA using railings that progressed from step-to to step-through pattern on ascent/descent of 6\" step height  CARE Score: 4  Discharge Goal: Independent      Additional Therapeutic activities/exercises completed this date:     []   Nu-step:  Time:        Level:         #Steps:       []   Rebounder:    []  Seated     []  Standing        [x]   Balance training: level surface ambulation (at least 150 ft) while carrying her personal backpack with SBA         []   Postural training    []   Supine ther ex (reps/sets):     []   Seated ther ex (reps/sets):     []   Standing ther ex (reps/sets):     []   Other:  Toileting activity completed with    []   Other:    Comments:      Patient/Caregiver Education and Training:   []   Role of PT  []   Education about Dx  []   Use of call light for assist   []   HEP provided and explained   [x]   Treatment plan reviewed  []   Home safety  []   Wheelchair mobility/management   []   Body mechanics  []   Bed Mobility/Transfer technique  []   Gait technique/sequencing  []   Proper use of assistive device/adaptive equipment  []   Stair training/Advanced mobility safety and technique  []   Reinforced patient's precautions/mobility while maintaining precautions  []   Postural awareness  []   Family/caregiver training  []   Progress was updated and reviewed in Rehabtracker with patient and/or family this date. [x]   Other: Pt education about care conference process and team goals related to discharge planning    Treatment Plan for Next Session: curb step training with SPC, progress mobility tasks that are SBA to Ind if able     Assessment: This pt demonstrated a positive response to today's treatment as evidenced by steady gait quality over level surface even while carrying her small backpack and progression of stair training to taller step height. The patient is making good progress toward established goals as evidenced by QI scores.      Treatment/Activity Tolerance:   [x] Tolerated treatment with no adverse effects    [] Patient limited by fatigue  [] Patient limited by pain   [] Patient limited by medical complications:    [] Adverse reaction to Tx:   [] Significant change in status    Safety:       []  bed alarm set    [x]  chair alarm set    []  Pt refused alarms                []  Telesitter activated      [x]  Gait belt used during tx session      [x]other: spouse and son present in room        Number of Minutes/Billable Intervention  Gait Training 30   Therapeutic Exercise    Neuro Re-Ed    Therapeutic Activity 30   Wheelchair Propulsion    Group    Other:    TOTAL 60     Social History  Social/Functional History  Lives With: Spouse (Pt lives with  - Hattie Mike who is in good health)  Type of Home: House  Home Layout: One level  Home Access: Level entry  Bathroom Shower/Tub:  (Walk in tub with built in seat and handheld shower head.)  Bathroom Toilet: Standard  Bathroom Equipment: Grab bars in shower, Hand-held shower, Built-in shower seat  Bathroom Accessibility: Accessible  Home Equipment: Reacher  Has the patient had two or more falls in the past year or any fall with injury in the past year?: No  ADL Assistance: [address]: Independent  Homemaking Responsibilities: Yes  Meal Prep Responsibility: Primary (Shared responsibility with  (uses microwave and eats out a lot). )  Laundry Responsibility: Primary (Shared responsibility with .)  Cleaning Responsibility: Primary (Shared responsibility with .)  Bill Paying/Finance Responsibility: Secondary (Pt's  manages most of the bills online.)  Shopping Responsibility: Primary (Shared responsibility with .)  Dependent Care Responsibility: Primary (Pt cares for Ahmad Diss (new toy poodle puppy). )  Health Care Management: Primary (Pt was taking 3 medications at home from the bottles.)  Ambulation Assistance: Independent  Transfer Assistance: Independent (Pt reports difficulty with car transfers, however able to do it without assist.)  Active : No  Patient's  Info: Lost drivers license and has to retest.  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 Puppy Miranda, Spouse manages finances. Pt and spouse share grocery shopping, Hindu[employer],  IADL Comments: Pt was taking 3 medications at home from the bottles. Pt cares for new [address], toy poodle. Additional Comments: Pt sleeps in regular flat bed. Objective                                                                                    Goals:  (Update in navigator)  Short Term Goals  Time Frame for Short Term Goals: 7 tx days:  Short Term Goal 1: Pt will complete OOB transfers Ind. Short Term Goal 2: Pt will ambulate >/=150 ft over level surface and at least 10 ft of uneven surface Ind.   Short Term Goal 3: Pt will ascend/descend curb step using SPC and 1 flight of stairs using railing/s Mod Ind. Short Term Goal 4: Pt will complete object retrieval using reacher Mod Ind.:   :        Plan of Care                                                                              Times per week: 5 days per week for a minimum of 60 minutes/day plus group as appropriate for 60 minutes.   Treatment to include Current Treatment Recommendations: Strengthening, Balance training, Functional mobility training, Transfer training, Endurance training, Gait training, Stair training, Pain management, Home exercise program, Safety education & training, Patient/Caregiver education & training, Therapeutic activities    Electronically signed by   Shanelle Bellamy, PT  10/6/2022, 12:57 PM

## 2022-10-06 NOTE — PROGRESS NOTES
Occupational Therapy  Physical Rehabilitation: OCCUPATIONAL THERAPY     [x] daily progress note       [] discharge       Patient Name:  Luisa Poe   :  1940 MRN: [id number]  Room:  [address] [place] [address] Date of Admission: 10/4/2022  Rehabilitation Diagnosis:   Metabolic encephalopathy [A36.61]       Date 10/6/2022       Day of ARU Week:  3   Time IN/OUT 1033/1133   Individual Tx Minutes 60   Group Tx Minutes    Co-Treat Minutes    Concurrent Tx Minutes    TOTAL Tx Time Mins 60   Variance Time    Variance Time []   Refusal due to:     []   Medical hold/reason:    []   Illness   []   Off Unit for test/procedure  []   Extra time needed to complete task  []   Therapeutic need  []   Other (specify):   Restrictions Restrictions/Precautions: Fall Risk, General Precautions         Communication with other providers: []   OK to see per nursing:     [x]   Spoke with team member regarding:      Subjective observations and cognitive status: Pt sitting in chair with  and son. Pt agreeable to therapy.  Pt reported that she didn't get any sleep      Pain level/location:    5/10       Location: back    Discharge recommendations  Anticipated discharge date: TBD  Destination: []home alone   [x]home alone w assist prn   [] home w/ family    [] Continuous supervision       []SNF    [] Assisted living     [] Other:   Continued therapy: []HHC OT  []OUTPATIENT  OT   [] No Further OT  Equipment needs:TBD     Toileting:  pt completed hygiene and clothing management with S      Toilet Transfers:  S   Device Used:    [x]   Standard Toilet         []   Grab Bars           []  Bedside Commode       []   Elevated Toilet          []   Other:        Bed Mobility:           [x]   Pt received out of bed and returned to recliner at end of session       Transfers:    Sit--> Stand: Sup  Stand --> Sit:  Sup    Assistive device required for transfer:   None       Functional Mobility:    Assistance: SUP - no assist AD to/from gym   Device:   [] Rolling Walker     []   Standard Walker []   Wheelchair        []   [employer]       []   4-Wheeled Melvia Crofts         []   Cardiac Walker       []   Other:        Homemaking Tasks:  None      Additional Therapeutic activities/exercises completed this date:     []   ADL Training   []   Balance/Postural training     []   Bed/Transfer Training   []   Endurance Training   []   Neuromuscular Re-ed   []   Nu-step:  Time:        Level:         #Steps:       []   Rebounder:    []  Seated     []  Standing        []   Supine Ther Ex (reps/sets):     [x]   Seated Ther Ex (reps/sets):  see below in comments   []   Standing Ther Ex (reps/sets):     []   Other:      Comments:    Use of 2lb dumbbell for exercises and completed bilaterally:  12x bicep curls   12x shoulder flexion/ extension  12x forward punches     Use of orange thera-band for exercises and completed bilaterally:  12x horizontal ABD   12x scaption   12x shoulder flexion/extension  12x shoulder extension    Theraputty exercises:   Power grasp   Pincer grasp   Rolls   Hand extension     Patient/Caregiver Education and Training:   []   [employer] Equipment Use  []   Bed Mobility/Transfer Technique/Safety  []   Energy Conservation Tips  []   Family training  []   Postural Awareness  []   Safety During Functional Activities  []   Reinforced Patient's Precautions   []   Progress was updated and reviewed in Rehabtracker with patient and/or family this         date. Treatment Plan for Next Session:Continue POC    Assessment: This pt demonstrated a positive negative response to today's treatment as evidenced by completing strengthening exercises with no increase in pain. The patient is making  progress toward established goals as evidenced by QI scores. Ongoing deficits are observed in the areas of  endurance and strength, continued focus on this is recommended.        Treatment/Activity Tolerance:   [x] Tolerated treatment with no adverse effects    [] Patient limited by fatigue  [] Patient limited by pain   [] Patient limited by medical complications:    [] Adverse reaction to Tx:   [] Significant change in status    Safety:       []  bed alarm set    [x]  chair alarm set    []  Pt refused alarms                []  Telesitter activated      [x]  Gait belt used during tx session      []other:       Number of Minutes/Billable Intervention  Therapeutic Exercise 60   ADL Self-care    Neuro Re-Ed    Therapeutic Activity    Group    Other:    TOTAL 60       Social History  Social/Functional History  Lives With: Spouse (Pt lives with  - Camila Ladd who is in good health)  Type of Home: House  Home Layout: One level  Home Access: Level entry  Bathroom Shower/Tub:  (Walk in tub with built in seat and handheld shower head.)  Bathroom Toilet: Standard  Bathroom Equipment: Grab bars in shower, Hand-held shower, Built-in shower seat  Bathroom Accessibility: Accessible  Home Equipment: Reacher  Has the patient had two or more falls in the past year or any fall with injury in the past year?: No  ADL Assistance: [address] [place] [address] Avenue: Independent  Homemaking Responsibilities: Yes  Meal Prep Responsibility: Primary (Shared responsibility with  (uses microwave and eats out a lot). )  Laundry Responsibility: Primary (Shared responsibility with .)  Cleaning Responsibility: Primary (Shared responsibility with .)  Bill Paying/Finance Responsibility: Secondary (Pt's  manages most of the bills online.)  Shopping Responsibility: Primary (Shared responsibility with .)  Dependent Care Responsibility: Primary (Pt cares for Shantelle Likens (new toy poodle puppy). )  Health Care Management: Primary (Pt was taking 3 medications at home from the bottles.)  Ambulation Assistance: Independent  Transfer Assistance: Independent (Pt reports difficulty with car transfers, however able to do it without assist.)  Active : No  Patient's  Info: Lost drivers license and has to retest.  Mode of Transportation: Car  Education: HS  Type of Occupation: Pt is a missionary, has a boat to transport doctors and nurses and worked in [employer] on the [facility] [address]: Pt enjoys reading. Sherif Jean, Spouse manages finances. Pt and spouse share grocery shopping, [place],  IADL Comments: Pt was taking 3 medications at home from the bottles. Pt cares for new [address], toy poodle. Additional Comments: Pt sleeps in regular flat bed. Objective                                                                                    Goals:  (Update in navigator)  Short Term Goals  Time Frame for Short Term Goals: STGs=LTGs:  Long Term Goals  Time Frame for Long Term Goals : 7-10 days or until d/c. Long Term Goal 1: Pt will complete oral hygiene and grooming tasks c Mod I.  Long Term Goal 2: Pt will complete total body bathing c AE PRN and Mod I.  Long Term Goal 3: Pt will complete UB dressing c IND. Long Term Goal 4: Pt will complete LB dressing c AE PRN and Mod I.  Long Term Goal 5: Pt will doff/don footwear c AE PRN and Mod I. Additional Goals?: Yes  Long Term Goal 6: Pt will complete toileting c Mod I.  Long Term Goal 7: Pt will perform functional transfers (bed, chair, toilet, shower) c DME PRN and Mod I.  Long Term Goal 8: Pt will perform therex/therax to facilitate an increase in strength/endurance (c emphasis on dynamic standing balance/tolerance > 8 mins) c Mod I.  Long Term Goal 9: Pt will perform home management tasks c Mod I.:        Plan of Care                                                                              Times per week: 5 days per week for a minimum of 60 minutes/day plus group as appropriate for 60 minutes.   Treatment to include Occupational Therapy Plan  Current Treatment Recommendations: Strengthening, Balance training, Functional mobility training, Endurance training, Pain management, Safety education & training, Patient/Caregiver education & training, Equipment evaluation, education, & procurement, Positioning, Self-Care / ADL, Home management training, Cognitive/Perceptual training    Electronically signed by   Lucrecia Wilson OT,  10/6/2022, 11:07 AM

## 2022-10-07 LAB
ANION GAP SERPL CALCULATED.3IONS-SCNC: 9 MMOL/L (ref 4–16)
BUN BLDV-MCNC: 13 MG/DL (ref 6–23)
CALCIUM SERPL-MCNC: 9.2 MG/DL (ref 8.3–10.6)
CHLORIDE BLD-SCNC: 107 MMOL/L (ref 99–110)
CO2: 25 MMOL/L (ref 21–32)
CREAT SERPL-MCNC: 0.6 MG/DL (ref 0.6–1.1)
GFR AFRICAN AMERICAN: >60 ML/MIN/1.73M2
GFR NON-AFRICAN AMERICAN: >60 ML/MIN/1.73M2
GLUCOSE BLD-MCNC: 93 MG/DL (ref 70–99)
HCT VFR BLD CALC: 37 % (ref 37–47)
HEMOGLOBIN: 12 GM/DL (ref 12.5–16)
MCH RBC QN AUTO: 31.5 PG (ref 27–31)
MCHC RBC AUTO-ENTMCNC: 32.4 % (ref 32–36)
MCV RBC AUTO: 97.1 FL (ref 78–100)
PDW BLD-RTO: 15.9 % (ref 11.7–14.9)
PLATELET # BLD: 262 K/CU MM (ref 140–440)
PMV BLD AUTO: 9 FL (ref 7.5–11.1)
POTASSIUM SERPL-SCNC: 4.1 MMOL/L (ref 3.5–5.1)
RBC # BLD: 3.81 M/CU MM (ref 4.2–5.4)
SODIUM BLD-SCNC: 141 MMOL/L (ref 135–145)
WBC # BLD: 7.5 K/CU MM (ref 4–10.5)

## 2022-10-07 PROCEDURE — 94761 N-INVAS EAR/PLS OXIMETRY MLT: CPT

## 2022-10-07 PROCEDURE — 6360000002 HC RX W HCPCS: Performed by: PHYSICAL MEDICINE & REHABILITATION

## 2022-10-07 PROCEDURE — 94150 VITAL CAPACITY TEST: CPT

## 2022-10-07 PROCEDURE — 97535 SELF CARE MNGMENT TRAINING: CPT

## 2022-10-07 PROCEDURE — 97530 THERAPEUTIC ACTIVITIES: CPT

## 2022-10-07 PROCEDURE — 97116 GAIT TRAINING THERAPY: CPT

## 2022-10-07 PROCEDURE — 6370000000 HC RX 637 (ALT 250 FOR IP): Performed by: PHYSICAL MEDICINE & REHABILITATION

## 2022-10-07 PROCEDURE — 1280000000 HC REHAB R&B

## 2022-10-07 PROCEDURE — 85027 COMPLETE CBC AUTOMATED: CPT

## 2022-10-07 PROCEDURE — 36415 COLL VENOUS BLD VENIPUNCTURE: CPT

## 2022-10-07 PROCEDURE — 80048 BASIC METABOLIC PNL TOTAL CA: CPT

## 2022-10-07 PROCEDURE — 97110 THERAPEUTIC EXERCISES: CPT

## 2022-10-07 PROCEDURE — 99232 SBSQ HOSP IP/OBS MODERATE 35: CPT | Performed by: PHYSICAL MEDICINE & REHABILITATION

## 2022-10-07 RX ADMIN — AMIODARONE HYDROCHLORIDE 100 MG: 200 TABLET ORAL at 08:58

## 2022-10-07 RX ADMIN — DOCUSATE SODIUM 100 MG: 100 CAPSULE, LIQUID FILLED ORAL at 08:58

## 2022-10-07 RX ADMIN — LACTULOSE 20 G: 10 SOLUTION ORAL at 08:58

## 2022-10-07 RX ADMIN — RIVAROXABAN 20 MG: 20 TABLET, FILM COATED ORAL at 08:58

## 2022-10-07 RX ADMIN — LACTULOSE 20 G: 10 SOLUTION ORAL at 12:26

## 2022-10-07 RX ADMIN — METHYLPREDNISOLONE 4 MG: 4 TABLET ORAL at 06:45

## 2022-10-07 RX ADMIN — METHYLPREDNISOLONE 4 MG: 4 TABLET ORAL at 19:30

## 2022-10-07 RX ADMIN — METHYLPREDNISOLONE 4 MG: 4 TABLET ORAL at 12:26

## 2022-10-07 RX ADMIN — LACTULOSE 20 G: 10 SOLUTION ORAL at 19:30

## 2022-10-07 RX ADMIN — SENNOSIDES 8.6 MG: 8.6 TABLET, FILM COATED ORAL at 19:30

## 2022-10-07 ASSESSMENT — PAIN DESCRIPTION - FREQUENCY: FREQUENCY: INTERMITTENT

## 2022-10-07 ASSESSMENT — PAIN DESCRIPTION - PAIN TYPE: TYPE: CHRONIC PAIN

## 2022-10-07 ASSESSMENT — PAIN SCALES - GENERAL: PAINLEVEL_OUTOF10: 4

## 2022-10-07 ASSESSMENT — PAIN DESCRIPTION - ONSET: ONSET: ON-GOING

## 2022-10-07 ASSESSMENT — PAIN DESCRIPTION - DESCRIPTORS: DESCRIPTORS: ACHING

## 2022-10-07 ASSESSMENT — PAIN DESCRIPTION - LOCATION: LOCATION: BACK

## 2022-10-07 NOTE — PROGRESS NOTES
Chaparro Roman    : 1940  Acct #: [de-identified]  MRN: [id number]              PM&R Progress Note      Admitting diagnosis: Encephalopathy ( [address])     Comorbid diagnoses impacting rehabilitation: Generalized weakness, gait disturbance, toxic reaction to baclofen, Katz with hyperammonemia, permanent atrial fibrillation, essential hypertension, chronic back pain, rheumatoid arthritis. Chief complaint: Fatigue with activity. Some positional dizziness. Prior (baseline) level of function: Independent. Current level of function:         Current  IRF-STEFFANY and Goals:   Occupational Therapy:    Short Term Goals  Time Frame for Short Term Goals: STGs=LTGs :   Long Term Goals  Time Frame for Long Term Goals : 7-10 days or until d/c. Long Term Goal 1: Pt will complete oral hygiene and grooming tasks c Mod I.  Long Term Goal 2: Pt will complete total body bathing c AE PRN and Mod I.  Long Term Goal 3: Pt will complete UB dressing c IND. Long Term Goal 4: Pt will complete LB dressing c AE PRN and Mod I.  Long Term Goal 5: Pt will doff/don footwear c AE PRN and Mod I. Additional Goals?: Yes  Long Term Goal 6: Pt will complete toileting c Mod I.  Long Term Goal 7: Pt will perform functional transfers (bed, chair, toilet, shower) c DME PRN and Mod I.  Long Term Goal 8: Pt will perform therex/therax to facilitate an increase in strength/endurance (c emphasis on dynamic standing balance/tolerance > 8 mins) c Mod I.  Long Term Goal 9: Pt will perform home management tasks c Mod I. :                                       Eating: Eating  Assistance Needed: Independent  Comment: Pt able to open packages to eat breakfast independently during session. CARE Score: 6  Discharge Goal: Independent       Oral Hygiene: Oral Hygiene  Assistance Needed: Supervision or touching assistance  Comment: SBA to perform oral hygiene routine in stance.   CARE Score: 4  Discharge Goal: Independent    UB/LB Bathing: Shower/Bathe Self  Assistance Needed: Supervision or touching assistance  Comment: Pt completed full shower, majority seated, with CGA/SBA in stance to wash julisa area and rear. CARE Score: 4  Discharge Goal: Independent    UB Dressing: Upper Body Dressing  Assistance Needed: Supervision or touching assistance  Comment: SBA to reach for clothing in closet, able to doff/don shirt IND. CARE Score: 4  Discharge Goal: Independent         LB Dressing: Lower Body Dressing  Assistance Needed: Supervision or touching assistance  Comment: CGA/SBA in stance to manage underwear and pants over hips. CARE Score: 4  Discharge Goal: Independent    Donning and [place] Footwear: Putting On/Taking Off Footwear  Assistance Needed: Setup or clean-up assistance  Comment: Pt able to doff/don socks and tennis shoes. CARE Score: 5  Discharge Goal: Independent      Toiletin [address] needed: Supervision or touching assistance  Comment: SBA to manage clothing in stance and able to perform perineal hygiene seated after episode of urination. CARE Score: 4  Discharge Goal: Independent      Toilet Transfers: Toilet Transfer  Assistance needed: Supervision or touching assistance  Comment: CG/SBA with use of 2WW and grab bars. CARE Score: 4  Discharge Goal: Independent    Physical Therapy:   Short Term Goals  Time Frame for Short Term Goals: 7 tx days:  Short Term Goal 1: Pt will complete OOB transfers Ind. Short Term Goal 2: Pt will ambulate >/=150 ft over level surface and at least 10 ft of uneven surface Ind. Short Term Goal 3: Pt will ascend/descend curb step using SPC and 1 flight of stairs using railing/s Mod Ind. Short Term Goal 4: Pt will complete object retrieval using reacher Mod Ind.             Bed Mobility:   Sit to Lying  Assistance Needed: Independent  Comment: Ind in regular bed  CARE Score: 6  Discharge Goal: Independent  Roll Left and Right  Assistance Needed: Independent  Comment: Ind in regular bed  CARE Score: 6  Discharge Goal: Independent  Lying to Sitting on Side of Bed  Assistance Needed: Independent  Comment: Ind in regular bed  CARE Score: 6  Discharge Goal: Independent    Transfers:    Sit to Stand  Assistance Needed: Supervision or touching assistance  Comment: SBA without AD (definite use of BUE)  CARE Score: 4  Discharge Goal: Independent  Chair/Bed-to-Chair Transfer  Assistance Needed: Supervision or touching assistance  Comment: SBA without AD (definite use of BUE)  CARE Score: 4  Discharge Goal: Independent     Car Transfer  Assistance Needed: Supervision or touching assistance  Comment: CGA without AD (used car as external support; self-assisted RLE)  CARE Score: 4  Discharge Goal: Independent    Ambulation:    Walking Ability  Does the Patient Walk?: Yes     Walk 10 Feet  Assistance Needed: Supervision or touching assistance  Comment: SBA without AD  CARE Score: 4  Discharge Goal: Independent     Walk 50 Feet with Two Turns  Assistance Needed: Supervision or touching assistance  Comment: SBA without AD  CARE Score: 4  Discharge Goal: Independent     Walk 150 Feet  Assistance Needed: Supervision or touching assistance  Comment: SBA without AD ; pt was able to ambulate up to 181 ft today  CARE Score: 4  Discharge Goal: Independent     Walking 10 Feet on Uneven Surfaces  Assistance Needed: Supervision or touching assistance  Comment: CGA without AD over concrete and outdoor ramp  CARE Score: 4  Discharge Goal: Independent     1 Step (Curb)  Assistance Needed: Partial/moderate assistance  Comment: Min A without AD (pauses to readjust JOSE prior to stepping up/down; required HHA on descent due to fear of falling)  CARE Score: 3  Discharge Goal: Independent     4 Steps  Assistance Needed: Supervision or touching assistance  Comment: SBA using railings with step-to pattern on ascent/descent of 6\" step height  CARE Score: 4  Discharge Goal: Independent     12 Steps  Assistance Needed: Supervision or touching assistance  Comment: SBA using railings that progressed from step-to to step-through pattern on ascent/descent of 6\" step height  CARE Score: 4  Discharge Goal: Independent       Wheelchair:  w/c Ability: Wheelchair Ability  Uses a Wheelchair and/or Scooter?: No                Balance:        Object: Picking Up Object  Assistance Needed: Supervision or touching assistance  Comment: CGA in unsupported stance but required use of reacher  CARE Score: 4  Discharge Goal: Independent    I      Exam:    Blood pressure (!) 157/78, pulse 76, temperature 97.7 °F (36.5 °C), resp. rate 18, height 5' 3\" (1.6 m), weight 165 lb 9.1 oz (75.1 kg), SpO2 94 %. General: Upright in the bedside chair with legs elevated. Easily distracted. Following directions a bit more promptly. HEENT: No signs of trauma to her head or neck. Visual fields seem full. Neck movement appropriate for age. No JVD. Pulmonary: No wheezing, coughing or rhonchi. Cardiac: Heart rate is controlled but occasional premature beats were present. Abdomen: Patient's abdomen is soft and nondistended. Bowel sounds were present throughout. There was no rebound, guarding or masses noted. Upper extremities: No new bruising or swelling. Lower extremities: No signs of DVT. Trace reflexes. Normal tone. Sitting balance was fair. Standing balance was poor. No results found for: WBC, HGB, HCT, MCV, PLT  No results found for: INR, PROTIME  No results found for: CREATININE, BUN, NA, K, CL, CO2  No results found for: ALT, AST, GGT, ALKPHOS, BILITOT    Expected length of stay  prior to a supervised level of function for discharge home with a walker and Alda Arreaga OT/PT is 10/10/2022. Recommendations:    Metabolic encephalopathy with gait disturbance: She benefits from rest breaks to endure the activities of her daily occupational and physical therapy with speech-language pathology. Blood count checked by the end of the week.   Providing her treatment in a calm and consistent environment to improve retention of new information and to reduce distractions. Offering her written and verbal instructions when possible. Trying to involve caregivers with training is much as possible. We must minimize medication use, monitor her GELLER and provide nutritional support. She benefits from adaptive equipment training, pulmonary hygiene measures and DVT prophylaxis. Verbal cues and minimum physical assistance for transfers. DVT prophylaxis: Prior to rehab, she had resumed her Xarelto for her atrial fibrillation. This should protect her against new DVT. I must periodically monitor her hemoglobin while on this medication. Weightbearing activities are slowly improving daily. GI prophylaxis is available. No new bruising or swelling. GELLER: Lactulose 20 g 3 times daily and monitoring her ammonia level should her mentation dictate this. Avoiding acetaminophen. Uncontrolled pain: We must minimize use of acetaminophen with her liver disease. Steroid taper ongoing. Diathermy and progressive mobilization. Range of motion exercises. Low-dose tramadol has not been needed. Minimal headaches. Atrial fibrillation: She has now anticoagulated with Xarelto. Cordarone for rate control. Vital signs are checked at rest and with activity. Daily weights do not reveal any decompensation. Hypertension: Target systolic blood pressures [id number]. Currently her blood pressure is within the target range without medications. Monitoring closely. Rheumatoid arthritis: This disease is likely contributing to her arthralgias. Her steroid taper will likely help control her symptoms in the short-term. Long-term management will be deferred to her PCP. Counseling and Coordination of Care: In care conference today I met with the patient's OT, PT, RN and . We discussed the patient's problems, progress and prognosis.  Disposition issues were clarified and plans were established for ongoing rehabilitation efforts beyond the ARU stay. I reviewed this information with the patient during a second distinct visit with the patient. More than half of the total time of 35 minutes spent with the patient involved counseling and coordination of care.

## 2022-10-07 NOTE — PLAN OF CARE
Problem: Discharge Planning  Goal: Discharge to home or other facility with appropriate resources  10/7/2022 0651 by Luana Macias RN  Outcome: Progressing     Problem: ABCDS Injury Assessment  Goal: Absence of physical injury  10/7/2022 1121 by Nicole Viera RN  Outcome: Progressing  10/7/2022 0651 by Luana Macias RN  Outcome: Progressing     Problem: Pain  Goal: Verbalizes/displays adequate comfort level or baseline comfort level  10/7/2022 0651 by Luana Macias RN  Outcome: Progressing

## 2022-10-07 NOTE — PROGRESS NOTES
Physical Therapy    [x] daily progress note       [] discharge       Patient Name:  Estelita Perera   :  1940 MRN: [id number]  Room:  [address] [place] [address] Date of Admission: 10/4/2022  Rehabilitation Diagnosis:   Metabolic encephalopathy [C96.31]       Date 10/7/2022       Day of ARU Week:  4   Time IN/OUT 1500/1600   Individual Tx Minutes 60   Group Tx Minutes    Co-Treat Minutes    Concurrent Tx Minutes    TOTAL Tx Time Mins 60   Variance Time    Variance Time []   Refusal due to:     []   Medical hold/reason:    []   Illness   []   Off Unit for test/procedure  []   Extra time needed to complete task  []   Therapeutic need  []   Other (specify):   Restrictions Restrictions/Precautions  Restrictions/Precautions: Fall Risk, General Precautions      Interdisciplinary communication [x]   Cleared for therapy per nursing     []   RN notified about issues during session  [x]   RN updated on pt performance and is cleared to be Ind in room   []   Spoke with   []   Spoke with OT  []   Spoke with MD  []   Other:    Subjective observations and cognitive status: Pt resting in bedside chair without armrests stating that this chair is more comfortable for her back as she is more upright, heating pad in use, states pain relief with use of heating pad and Biofreeze, expressed awareness of discharge plan on Monday.       Pain level/location:   2 /10       Location: low back    Discharge recommendations  Anticipated discharge date:  10/10/2022  Destination: []home alone   []home alone with assist PRN     [x] home w/ family      [] Continuous supervision  []SNF    [] Assisted living     [] Other:  Continued therapy: []HHC PT  []OUTPATIENT PT   [x] No Further PT  []SNF PT  Caregiver training recommended: []Yes  [] No   Equipment needs: none      PT IRF-STEFFANY scores and goals for discharge assessment:   Roll Left and Right  Assistance Needed: Independent  Comment: Ind in regular bed  CARE Score: 6  Discharge Goal: Independent    Sit to Lying  Assistance Needed: Independent  Comment: Ind in regular bed  CARE Score: 6  Discharge Goal: Independent    Lying to Sitting on Side of Bed  Assistance Needed: Independent  Comment: Ind in regular bed  CARE Score: 6  Discharge Goal: Independent    Sit to Stand  Assistance Needed: Independent  Comment: Ind  CARE Score: 6  Discharge Goal: Independent    Car Transfer  Assistance Needed: Independent  Comment: pt able to step in steadily using car as external support and get up Ind  CARE Score: 6  Discharge Goal: Independent    Walk 10 Feet? Walk 10 Feet?: Yes    1 Step  1 Step?: Yes    Picking Up Object  Assistance Needed: Independent  Comment: pt steady in unsupported stance but required use of reacher to complete task Mod Ind  CARE Score: 6  Discharge Goal: Independent    Wheelchair Ability  Uses a Wheelchair and/or Scooter?: No    Walking Ability  Does the Patient Walk?: Yes    Walk 10 Feet  Assistance Needed: Independent  Comment: Ind without AD (pt with short stride length but gait quality was consistently steady)  CARE Score: 6  Discharge Goal: Independent    Walk 50 Feet with Two Turns  Assistance Needed: Independent  Comment: Ind without AD (pt with short stride length but gait quality was consistently steady)  CARE Score: 6  Discharge Goal: Independent    Walk 150 Feet  Assistance Needed: Independent  Comment: Ind without AD (pt with short stride length but gait quality was consistently steady); pt was able to ambulate 384 ft consecutively  CARE Score: 6  Discharge Goal: Independent    Walking 10 Feet on Uneven Surfaces  Assistance Needed: Independent  Comment: Ind  CARE Score: 6  Discharge Goal: Independent    1 Step (Curb)  Assistance Needed: Supervision or touching assistance  Comment: SBA with SPC  CARE Score: 4  Discharge Goal: Independent    4 Steps  Assistance Needed: Independent  Comment:  Mod Ind with L ascending/descending rail with consistent step-through pattern on ascent/descent  CARE Score: 6  Discharge Goal: Independent    12 Steps  Assistance Needed: Independent  Comment: Mod Ind with L ascending/descending rail with consistent step-through pattern on ascent/descent of regular staircase  CARE Score: 6  Discharge Goal: Independent    Patient/Caregiver Education and Training:   []   Role of PT  []   Education about Dx  []   Use of call light for assist   []   HEP provided and explained   [x]   Treatment plan reviewed  []   Home safety  []   Wheelchair mobility/management   []   Body mechanics  []   Bed Mobility/Transfer technique  []   Gait technique/sequencing  []   Proper use of assistive device/adaptive equipment  [x]   Stair training/Advanced mobility safety and technique  []   Reinforced patient's precautions/mobility while maintaining precautions  []   Postural awareness  []   Family/caregiver training  [x]   Progress was updated and reviewed with patient and family this date. [x]   Other: activities that can be safely performed Ind in room     Treatment Plan for Next Session: discharge QI tasks     Assessment: This pt demonstrated a positive response to today's treatment as evidenced by activity tolerance and performance in progressive gait training . The patient is making good progress toward established goals as evidenced by QI scores.      Treatment/Activity Tolerance:   [x] Tolerated treatment with no adverse effects    [] Patient limited by fatigue  [] Patient limited by pain   [] Patient limited by medical complications:    [] Adverse reaction to Tx:   [] Significant change in status    Safety:       []  bed alarm set    []  chair alarm set    []  Pt refused alarms                []  Telesitter activated      []  Gait belt used during tx session      [x]other: pt cleared to be Ind in room       Number of Minutes/Billable Intervention  Gait Training 40   Therapeutic Exercise    Neuro Re-Ed    Therapeutic Activity 20   Wheelchair Propulsion    Group    Other: TOTAL 60     Social History  Social/Functional History  Lives With: Spouse (Pt lives with  - Oneyda Rios who is in good health)  Type of Home: House  Home Layout: One level  Home Access: Level entry  Bathroom Shower/Tub:  (Walk in tub with built in seat and handheld shower head.)  Bathroom Toilet: Standard  Bathroom Equipment: Grab bars in shower, Hand-held shower, Built-in shower seat  Bathroom Accessibility: Accessible  Home Equipment: Reacher  Has the patient had two or more falls in the past year or any fall with injury in the past year?: No  ADL Assistance: [address]: Independent  Homemaking Responsibilities: Yes  Meal Prep Responsibility: Primary (Shared responsibility with  (uses microwave and eats out a lot). )  Laundry Responsibility: Primary (Shared responsibility with .)  Cleaning Responsibility: Primary (Shared responsibility with .)  Bill Paying/Finance Responsibility: Secondary (Pt's  manages most of the bills online.)  Shopping Responsibility: Primary (Shared responsibility with .)  Dependent Care Responsibility: Primary (Pt cares for Mariella Duverney (new toy poodle puppy). )  Health Care Management: Primary (Pt was taking 3 medications at home from the bottles.)  Ambulation Assistance: Independent  Transfer Assistance: Independent (Pt reports difficulty with car transfers, however able to do it without assist.)  Active : No  Patient's  Info: Lost drivers license and has to retest.  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 [address]: Pt enjoys reading. Sherif Jean, Spouse manages finances. Pt and spouse share grocery shopping, Sikhism,  IADL Comments: Pt was taking 3 medications at home from the bottles. Pt cares for new [address], toy poodle.   Additional Comments: Pt sleeps in regular flat bed.    Objective                                                                                    Goals:  (Update in navigator)  Short Term Goals  Time Frame for Short Term Goals: 7 tx days:  Short Term Goal 1: Pt will complete OOB transfers Ind. Short Term Goal 2: Pt will ambulate >/=150 ft over level surface and at least 10 ft of uneven surface Ind. Short Term Goal 3: Pt will ascend/descend curb step using SPC and 1 flight of stairs using railing/s Mod Ind. Short Term Goal 4: Pt will complete object retrieval using reacher Mod Ind.:   :        Plan of Care                                                                              Times per week: 5 days per week for a minimum of 60 minutes/day plus group as appropriate for 60 minutes.   Treatment to include Current Treatment Recommendations: Strengthening, Balance training, Functional mobility training, Transfer training, Endurance training, Gait training, Stair training, Pain management, Home exercise program, Safety education & training, Patient/Caregiver education & training, Therapeutic activities    Electronically signed by   Cherry Matias, PT  10/7/2022, 4:28 PM

## 2022-10-07 NOTE — PROGRESS NOTES
Occupational Therapy    Physical Rehabilitation: OCCUPATIONAL THERAPY     [x] daily progress note       [] discharge       Patient Name:  Shakira Connolly   :  1940 MRN: [id number]  Room:  [address] [place] [address] Date of Admission: 10/4/2022  Rehabilitation Diagnosis:   Metabolic encephalopathy [Z63.56]       Date 10/7/2022       Day of ARU Week:  4   Time IN/OUT 0745/0850  1305/1400   Individual Tx Minutes 65 + 55   Group Tx Minutes    Co-Treat Minutes    Concurrent Tx Minutes    TOTAL Tx Time Mins 120   Variance Time    Variance Time []   Refusal due to:     []   Medical hold/reason:    []   Illness   []   Off Unit for test/procedure  []   Extra time needed to complete task  []   Therapeutic need  []   Other (specify):   Restrictions Restrictions/Precautions: Fall Risk, General Precautions         Communication with other providers: [x]   OK to see per nursing:     []   Spoke with team member regarding:      Subjective observations and cognitive status: AM: Pt in semi-akers's position upon entrance, pleasant and agreeable to therapy. PM: Pt sitting up in chair, pleasant and agreeable to therapy. Pt states \"once I start doing therapy, I'll forget about the headache\". Pain level/location:  1st Session:  5 /10       Location: Low back \"soreness\"  2nd Session: 7/10 headache   Discharge recommendations  Anticipated discharge date:  10/10  Destination: []home alone   []home alone w assist prn   [x] home w/ family    [] Continuous supervision       []SNF    [] Assisted living     [] Other:   Continued therapy: []HHC OT  []OUTPATIENT  OT   [x] No Further OT  Equipment needs: None       ADLs:    Eating: Eating  Assistance Needed: Independent  Comment: X  CARE Score: 6  Discharge Goal: Independent       Oral Hygiene: Oral Hygiene  Assistance Needed: Independent  Comment: IND to perform oral hygiene in stance at sink.   CARE Score: 6  Discharge Goal: Independent    UB/LB Bathing: Shower/Bathe Self  Assistance Needed: Independent  Comment: Pt completed full shower with independence. CARE Score: 6  Discharge Goal: Independent    UB Dressing: Upper Body Dressing  Assistance Needed: Independent  Comment: IND, able to retrieve clothing and doff/don pullover shirt. CARE Score: 6  Discharge Goal: Independent         LB Dressing: Lower Body Dressing  Assistance Needed: Independent  Comment: IND, able to retrieve clothing and doff/don underwear and pants. CARE Score: 6  Discharge Goal: Independent    Donning and [facility] Footwear: Putting On/Taking Off Footwear  Assistance Needed: Independent  Comment: IND to doff/don socks and tennis shoes. CARE Score: 6  Discharge Goal: Independent        Bed Mobility:           []   Pt received out of bed   Supine --> Sit: IND      Transfers:    Sit--> Stand:  IND  Stand --> Sit:   IND  Stand-Pivot:   IND  Other:  shower bench IND  Assistive device required for transfer:   No device      Functional Mobility:    Assistance: Ind within room and 145 ft x 2 in hallway. Device:   []   Rolling Walker     []   Standard Jenn Von []   Wheelchair        []   [employer]       []   4-Wheeled Jenn Von         []   Cardiac Walker       [x]   Other:  No device      Homemaking Tasks:   Pt able to retrieve clothing from closet with independence. Additional Therapeutic activities/exercises completed this date:     [x]   ADL Training   [x]   Balance/Postural training     [x]   Bed/Transfer Training   [x]   Endurance Training   []   Neuromuscular Re-ed   []   Nu-step:  Time:        Level:         #Steps:       []   Rebounder:    []  Seated     []  Standing        []   Supine Ther Ex (reps/sets):     [x]   Seated Ther Ex (reps/sets):  Pt performed 15 mins on arm bike with Mod resistance, averaging 35 rpm. Pt required no rest breaks.  Pt completed BUE exercises with 2# dumbbell, 2 sets of 15 reps, including bicep curls, chest press, overhead press, shoulder horizontal abduction, shoulder external/internal rotation, forearm head.)  Bathroom Toilet: Standard  Bathroom Equipment: Grab bars in shower, Hand-held shower, Built-in shower seat  Bathroom Accessibility: Accessible  Home Equipment: Reacher  Has the patient had two or more falls in the past year or any fall with injury in the past year?: No  ADL Assistance: [address]: Independent  Homemaking Responsibilities: Yes  Meal Prep Responsibility: Primary (Shared responsibility with  (uses microwave and eats out a lot). )  Laundry Responsibility: Primary (Shared responsibility with .)  Cleaning Responsibility: Primary (Shared responsibility with .)  Bill Paying/Finance Responsibility: Secondary (Pt's  manages most of the bills online.)  Shopping Responsibility: Primary (Shared responsibility with .)  Dependent Care Responsibility: Primary (Pt cares for Bennye Dc (new toy poodle puppy). )  Health Care Management: Primary (Pt was taking 3 medications at home from the bottles.)  Ambulation Assistance: Independent  Transfer Assistance: Independent (Pt reports difficulty with car transfers, however able to do it without assist.)  Active : No  Patient's  Info: Lost drivers license and has to retest.  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 Puppy Miranda, Spouse manages finances. Pt and spouse share grocery shopping, Scientologist,  IADL Comments: Pt was taking 3 medications at home from the bottles. Pt cares for new [address], toy poodle. Additional Comments: Pt sleeps in regular flat bed. Objective                                                                                    Goals:  (Update in navigator)  Short Term Goals  Time Frame for Short Term Goals: STGs=LTGs:  Long Term Goals  Time Frame for Long Term Goals : 7-10 days or until d/c.   Long Term Goal 1: Pt will complete oral hygiene and grooming tasks c Mod I.  Long Term Goal 2: Pt will complete total body bathing c AE PRN and Mod I.  Long Term Goal 3: Pt will complete UB dressing c IND. Long Term Goal 4: Pt will complete LB dressing c AE PRN and Mod I.  Long Term Goal 5: Pt will doff/don footwear c AE PRN and Mod I. Additional Goals?: Yes  Long Term Goal 6: Pt will complete toileting c Mod I.  Long Term Goal 7: Pt will perform functional transfers (bed, chair, toilet, shower) c DME PRN and Mod I.  Long Term Goal 8: Pt will perform therex/therax to facilitate an increase in strength/endurance (c emphasis on dynamic standing balance/tolerance > 8 mins) c Mod I.  Long Term Goal 9: Pt will perform home management tasks c Mod I.:        Plan of Care                                                                              Times per week: 5 days per week for a minimum of 60 minutes/day plus group as appropriate for 60 minutes.   Treatment to include Occupational Therapy Plan  Current Treatment Recommendations: Strengthening, Balance training, Functional mobility training, Endurance training, Pain management, Safety education & training, Patient/Caregiver education & training, Equipment evaluation, education, & procurement, Positioning, Self-Care / ADL, Home management training, Cognitive/Perceptual training    Electronically signed by   ASHISH Qureshi, OTR/L #[id number]  10/7/2022, 3:16 PM

## 2022-10-08 PROCEDURE — 97530 THERAPEUTIC ACTIVITIES: CPT

## 2022-10-08 PROCEDURE — 6370000000 HC RX 637 (ALT 250 FOR IP): Performed by: PHYSICAL MEDICINE & REHABILITATION

## 2022-10-08 PROCEDURE — 97116 GAIT TRAINING THERAPY: CPT

## 2022-10-08 PROCEDURE — 97110 THERAPEUTIC EXERCISES: CPT

## 2022-10-08 PROCEDURE — 94150 VITAL CAPACITY TEST: CPT

## 2022-10-08 PROCEDURE — 6360000002 HC RX W HCPCS: Performed by: PHYSICAL MEDICINE & REHABILITATION

## 2022-10-08 PROCEDURE — 94761 N-INVAS EAR/PLS OXIMETRY MLT: CPT

## 2022-10-08 PROCEDURE — 1280000000 HC REHAB R&B

## 2022-10-08 RX ADMIN — LACTULOSE 20 G: 10 SOLUTION ORAL at 20:10

## 2022-10-08 RX ADMIN — DOCUSATE SODIUM 100 MG: 100 CAPSULE, LIQUID FILLED ORAL at 09:01

## 2022-10-08 RX ADMIN — BUTALBITAL, ACETAMINOPHEN AND CAFFEINE 1 TABLET: 50; 325; 40 TABLET ORAL at 09:46

## 2022-10-08 RX ADMIN — METHYLPREDNISOLONE 4 MG: 4 TABLET ORAL at 20:10

## 2022-10-08 RX ADMIN — BUTALBITAL, ACETAMINOPHEN AND CAFFEINE 1 TABLET: 50; 325; 40 TABLET ORAL at 22:32

## 2022-10-08 RX ADMIN — LACTULOSE 20 G: 10 SOLUTION ORAL at 09:02

## 2022-10-08 RX ADMIN — SENNOSIDES 8.6 MG: 8.6 TABLET, FILM COATED ORAL at 20:10

## 2022-10-08 RX ADMIN — LACTULOSE 20 G: 10 SOLUTION ORAL at 14:22

## 2022-10-08 RX ADMIN — METHYLPREDNISOLONE 4 MG: 4 TABLET ORAL at 06:23

## 2022-10-08 RX ADMIN — AMIODARONE HYDROCHLORIDE 100 MG: 200 TABLET ORAL at 09:01

## 2022-10-08 RX ADMIN — RIVAROXABAN 20 MG: 20 TABLET, FILM COATED ORAL at 09:01

## 2022-10-08 ASSESSMENT — PAIN SCALES - GENERAL
PAINLEVEL_OUTOF10: 2
PAINLEVEL_OUTOF10: 6

## 2022-10-08 ASSESSMENT — PAIN - FUNCTIONAL ASSESSMENT: PAIN_FUNCTIONAL_ASSESSMENT: ACTIVITIES ARE NOT PREVENTED

## 2022-10-08 ASSESSMENT — PAIN SCALES - WONG BAKER
WONGBAKER_NUMERICALRESPONSE: 0

## 2022-10-08 ASSESSMENT — PAIN DESCRIPTION - ORIENTATION: ORIENTATION: MID

## 2022-10-08 ASSESSMENT — PAIN DESCRIPTION - LOCATION
LOCATION: HEAD
LOCATION: HEAD

## 2022-10-08 ASSESSMENT — PAIN DESCRIPTION - DESCRIPTORS: DESCRIPTORS: ACHING

## 2022-10-08 NOTE — PROGRESS NOTES
Anastacia Welsh    : 1940  Acct #: [de-identified]  MRN: [id number]              PM&R Progress Note      Admitting diagnosis: Encephalopathy ( [place] [address])     Comorbid diagnoses impacting rehabilitation: Generalized weakness, gait disturbance, toxic reaction to baclofen, Katz with hyperammonemia, permanent atrial fibrillation, essential hypertension, chronic back pain, rheumatoid arthritis. Chief complaint: Less dizziness. Sleeping better and eating consistently. Anxious to get home after the weekend. Prior (baseline) level of function: Independent. Current level of function:         Current  [facility] and Goals:   Occupational Therapy:    Short Term Goals  Time Frame for Short Term Goals: STGs=LTGs :   Long Term Goals  Time Frame for Long Term Goals : 7-10 days or until d/c. Long Term Goal 1: Pt will complete oral hygiene and grooming tasks c Mod I.  Long Term Goal 2: Pt will complete total body bathing c AE PRN and Mod I.  Long Term Goal 3: Pt will complete UB dressing c IND. Long Term Goal 4: Pt will complete LB dressing c AE PRN and Mod I.  Long Term Goal 5: Pt will doff/don footwear c AE PRN and Mod I. Additional Goals?: Yes  Long Term Goal 6: Pt will complete toileting c Mod I.  Long Term Goal 7: Pt will perform functional transfers (bed, chair, toilet, shower) c DME PRN and Mod I.  Long Term Goal 8: Pt will perform therex/therax to facilitate an increase in strength/endurance (c emphasis on dynamic standing balance/tolerance > 8 mins) c Mod I.  Long Term Goal 9: Pt will perform home management tasks c Mod I. :                                       Eating: Eating  Assistance Needed: Independent  Comment: X  CARE Score: 6  Discharge Goal: Independent       Oral Hygiene: Oral Hygiene  Assistance Needed: Independent  Comment: IND to perform oral hygiene in stance at sink.   CARE Score: 6  Discharge Goal: Independent    UB/LB Bathing: Shower/Bathe Self  Assistance Needed: Independent  Comment: Pt completed full shower with independence. CARE Score: 6  Discharge Goal: Independent    UB Dressing: Upper Body Dressing  Assistance Needed: Independent  Comment: IND, able to retrieve clothing and doff/don pullover shirt. CARE Score: 6  Discharge Goal: Independent         LB Dressing: Lower Body Dressing  Assistance Needed: Independent  Comment: IND, able to retrieve clothing and doff/don underwear and pants. CARE Score: 6  Discharge Goal: Independent    Donning and [place] Footwear: Putting On/Taking Off Footwear  Assistance Needed: Independent  Comment: IND to doff/don socks and tennis shoes. CARE Score: 6  Discharge Goal: Independent      To[address] needed: Supervision or touching assistance  Comment: SBA to manage clothing in stance and able to perform perineal hygiene seated after episode of urination. CARE Score: 4  Discharge Goal: Independent      Toilet Transfers: Toilet Transfer  Assistance needed: Independent  Comment: CG/SBA with use of 2WW and grab bars. CARE Score: 6  Discharge Goal: Independent    Physical Therapy:   Short Term Goals  Time Frame for Short Term Goals: 7 tx days:  Short Term Goal 1: Pt will complete OOB transfers Ind. Short Term Goal 2: Pt will ambulate >/=150 ft over level surface and at least 10 ft of uneven surface Ind. Short Term Goal 3: Pt will ascend/descend curb step using SPC and 1 flight of stairs using railing/s Mod Ind. Short Term Goal 4: Pt will complete object retrieval using reacher Mod Ind.             Bed Mobility:   Sit to Lying  Assistance Needed: Independent  Comment: Ind in regular bed  CARE Score: 6  Discharge Goal: Independent  Roll Left and Right  Assistance Needed: Independent  Comment: Ind in regular bed  CARE Score: 6  Discharge Goal: Independent  Lying to Sitting on Side of Bed  Assistance Needed: Independent  Comment: Ind in regular bed  CARE Score: 6  Discharge Goal: Independent    Transfers:    Sit to Stand  Assistance Needed: Independent  Comment: Ind  CARE Score: 6  Discharge Goal: Independent  Chair/Bed-to-Chair Transfer  Assistance Needed: Independent  Comment: Ind  CARE Score: 6  Discharge Goal: Independent     Car Transfer  Assistance Needed: Independent  Comment: pt able to step in steadily using car as external support and get up Ind  CARE Score: 6  Discharge Goal: Independent    Ambulation:    Walking Ability  Does the Patient Walk?: Yes     Walk 10 Feet  Assistance Needed: Independent  Comment: Ind without AD (pt with short stride length but gait quality was consistently steady)  CARE Score: 6  Discharge Goal: Independent     Walk 50 Feet with Two Turns  Assistance Needed: Independent  Comment: Ind without AD (pt with short stride length but gait quality was consistently steady)  CARE Score: 6  Discharge Goal: Independent     Walk 150 Feet  Assistance Needed: Independent  Comment: Ind without AD (pt with short stride length but gait quality was consistently steady); pt was able to ambulate 384 ft consecutively  CARE Score: 6  Discharge Goal: Independent     Walking 10 Feet on Uneven Surfaces  Assistance Needed: Independent  Comment: Ind  CARE Score: 6  Discharge Goal: Independent     1 Step (Curb)  Assistance Needed: Supervision or touching assistance  Comment: SBA with SPC  CARE Score: 4  Discharge Goal: Independent     4 Steps  Assistance Needed: Independent  Comment: Mod Ind with L ascending/descending rail with consistent step-through pattern on ascent/descent  CARE Score: 6  Discharge Goal: Independent     12 Steps  Assistance Needed: Independent  Comment:  Mod Ind with L ascending/descending rail with consistent step-through pattern on ascent/descent of regular staircase  CARE Score: 6  Discharge Goal: Independent       Wheelchair:  w/c Ability: Wheelchair Ability  Uses a Wheelchair and/or Scooter?: No                Balance:        Object: Picking Up Object  Assistance Needed: Independent  Comment: pt steady in unsupported stance but required use of reacher to complete task Mod Ind  CARE Score: 6  Discharge Goal: Independent    I      Exam:    Blood pressure 137/87, pulse (!) 107, temperature 97.3 °F (36.3 °C), temperature source Oral, resp. rate 16, height 5' 3\" (1.6 m), weight 165 lb 9.1 oz (75.1 kg), SpO2 92 %. General: Up in a bedside chair. Answering direct questions. Fair insight and reasoning. HEENT: Gazing right and left. Supple neck. MMM. Clear speech. Pulmonary: No wheezes, rales or coughing. Cardiac: Her rate is quite controlled. Occasional premature beats noted. Abdomen: Patient's abdomen is soft and nondistended. Bowel sounds were present throughout. There was no rebound, guarding or masses noted. Upper extremities: She was able to raise both hands up overhead. Functional  strength without tremor or clonus. Lower extremities: No signs of DVT. Heels clear. Sitting balance was good. Standing balance was fair-.    Lab Results   Component Value Date    WBC 7.5 10/07/2022    HGB 12.0 (L) 10/07/2022    HCT 37.0 10/07/2022    MCV 97.1 10/07/2022     10/07/2022     No results found for: INR, PROTIME  Lab Results   Component Value Date    CREATININE 0.6 10/07/2022    BUN 13 10/07/2022     10/07/2022    K 4.1 10/07/2022     10/07/2022    CO2 25 10/07/2022     No results found for: ALT, AST, GGT, ALKPHOS, BILITOT    Expected length of stay  prior to a supervised level of function for discharge home with a walker and [facility] OT/PT is 10/10/2022. Recommendations:    Metabolic encephalopathy with gait disturbance: She still benefits from some verbal cueing to fully execute the activities of her daily occupational and physical therapy with speech-language pathology. Hemoglobin stable at 12.0. We are now extending her treatment outside of the calm and consistent environment of her room. Offering her written and verbal instructions when possible.   T caregiver training planned before discharge. We must minimize medication use, monitor her GELLER and provide nutritional support. Ongoing adaptive equipment training, pulmonary hygiene measures and DVT prophylaxis. Verbal cues and CGA for transfers. DVT prophylaxis: Prior to rehab, she had resumed her Xarelto for her atrial fibrillation. This seems to be protecting her against new DVT. I must periodically monitor her hemoglobin while on this medication. Weightbearing activities are slowly improving daily. GI prophylaxis is available. No clinical signs of blood loss. GELLER: Lactulose 20 g 3 times daily and monitoring her ammonia level should her mentation dictate this. Avoiding acetaminophen. Uncontrolled pain: Avoiding the use of acetaminophen with her liver disease. She will complete the steroid taper this weekend. Diathermy and progressive mobilization. Range of motion exercises. Low-dose tramadol has not been needed. Minimal headaches. Atrial fibrillation: She has now anticoagulated with Xarelto. Cordarone for rate control. Vital signs are checked at rest and with activity. Daily weights do not reveal any decompensation. Hypertension: Target systolic blood pressures [id number]. Currently her blood pressure is within the target range without medications again today. Monitoring closely. Rheumatoid arthritis: This disease is likely contributing to her arthralgias. Her steroid taper will likely help control her symptoms in the short-term. Long-term management will be deferred to her PCP.

## 2022-10-08 NOTE — PLAN OF CARE
Problem: Discharge Planning  Goal: Discharge to home or other facility with appropriate resources  Outcome: Progressing  Flowsheets (Taken 10/8/2022 0853)  Discharge to home or other facility with appropriate resources:   Identify barriers to discharge with patient and caregiver   Arrange for needed discharge resources and transportation as appropriate   Identify discharge learning needs (meds, wound care, etc)   Refer to discharge planning if patient needs post-hospital services based on physician order or complex needs related to functional status, cognitive ability or social support system     Problem: Safety - Adult  Goal: Free from fall injury  Outcome: Progressing  Flowsheets (Taken 10/8/2022 0853)  Free From Fall Injury:   Instruct family/caregiver on patient safety   Based on caregiver fall risk screen, instruct family/caregiver to ask for assistance with transferring infant if caregiver noted to have fall risk factors     Problem: ABCDS Injury Assessment  Goal: Absence of physical injury  Outcome: Progressing  Flowsheets (Taken 10/8/2022 0853)  Absence of Physical Injury: Implement safety measures based on patient assessment     Problem: Pain  Goal: Verbalizes/displays adequate comfort level or baseline comfort level  Outcome: Progressing  Flowsheets (Taken 10/8/2022 0853)  Verbalizes/displays adequate comfort level or baseline comfort level:   Encourage patient to monitor pain and request assistance   Assess pain using appropriate pain scale   Administer analgesics based on type and severity of pain and evaluate response

## 2022-10-08 NOTE — PROGRESS NOTES
Physical Therapy    [x] daily progress note       [] discharge       Patient Name:  Sammi Officer   :  1940 MRN: [id number]  Room:  [address] [place] [address] Date of Admission: 10/4/2022  Rehabilitation Diagnosis:   Metabolic encephalopathy [H81.39]       Date 10/8/2022       Day of ARU Week:  5   Time IN/OUT 8073-3152   Individual Tx Minutes 60   TOTAL Tx Time Mins 60   Variance Time    Variance Time []   Refusal due to:     []   Medical hold/reason:    []   Illness   []   Off Unit for test/procedure  []   Extra time needed to complete task  []   Therapeutic need  []   Other (specify):   Restrictions Restrictions/Precautions  Restrictions/Precautions: Fall Risk, General Precautions      Communication with other providers: [x]   OK to see per nursing:     []   Spoke with team member regarding:      Subjective observations and cognitive status: Pt resting in bed, family in room, pt receptive to tx     Pain level/location: 0/10       Location:    Discharge recommendations  Anticipated discharge date:  10/10/2022  Destination: []home alone   []home alone with assist PRN     [x] home w/ family      [] Continuous supervision  []SNF    [] Assisted living     [] Other:  Continued therapy: []HHC PT  []OUTPATIENT PT   [x] No Further PT  []SNF PT  Caregiver training recommended: []Yes  [] No   Equipment needs: none      Bed Mobility:           []   Pt received out of bed   Rolling R/L:  Indep  Scooting:  Indep sit scoot to EOB  Supine --> Sit:  Indep  Sit --> Supine:  Indep  Bed features used:    [] HOB elevated      [] Bed rail                                    [x] No     Transfers:    Sit--> Stand:  Indep  Stand --> Sit:   Indep  Stand-Pivot:   Indep  Assistive device required for transfer:   none    Gait:    Distance:  427' +365'+107+55'  Assistance:  Indep  Device:  none  Gait Quality:   steady gait, functional speed, no LOB; pt given gait challenges without LOB for sudden stopping, multi directional head turns, and therapist giving pertubations. Mild change in gait speed but no LOB. Curb       Assistance:    first attempt SBA, then Supervision with/without AD; no change in balance or KAILYN noted today with/without AD, however without SPC, pt with tendency to descend slightly sideways but no LOB. Supportive Device:  none/ SPC  Height:   4\"          Additional Therapeutic activities/exercises completed this date:     []   Nu-step:  Time:        Level:         #Steps:       []   Rebounder:    []  Seated     []  Standing        [x]   Balance training: Dynamic balance at countertop without UE support for side stepping SBA, intermittent 1 UE support for backward walking SB-CGA with decreased step length, slight staggering steps; tandem walking 0-1 UE support CGA with multiple LOB req no more than CGA with some self recovery; braiding with 0-1 UE support SB-CGA. Min seq cues and demo for task.        []   Postural training    []   Supine ther ex (reps/sets):     []   Seated ther ex (reps/sets):     []   Standing ther ex (reps/sets):     []   Picked up object from floor                       []   Reacher used   []   Other:   []   Other:   []   Other:      Patient/Caregiver Education and Training:   [x]   Bed Mobility/Transfer technique/safety  [x]   Gait technique/sequencing  [x]   Proper use of assistive device  [x]   Advanced mobility safety and technique  []   Reinforced patient's precautions with mobility/functional tasks  []   Postural awareness  []   Family training  []   Other:    Treatment Plan for Next Session: QI next session        Treatment/Activity Tolerance:   [x] Tolerated treatment with no adverse effects    [] Patient limited by fatigue  [] Patient limited by pain   [] Patient limited by medical complications:    [] Adverse reaction to Tx:   [] Significant change in status    Safety:       []  bed alarm set    []  chair alarm set    []  Pt refused alarms                []  Telesitter activated      [x]  Gait belt used during tx session      [] Call light and belongings in reach       [] Other      Number of Minutes/Billable Intervention  Gait Training 30   Therapeutic Exercise    Neuro Re-Ed    Therapeutic Activity 30   Wheelchair Propulsion    Group    Other:    TOTAL 60         Social History  Social/Functional History  Lives With: Spouse (Pt lives with  - Juanita Roman who is in good health)  Type of Home: [address] [place] [address],[address]: One level  Home Access: Level entry  Bathroom Shower/Tub:  (Walk in tub with built in seat and handheld shower head.)  Bathroom Toilet: Standard  Bathroom Equipment: Grab bars in shower, Hand-held shower, Built-in shower seat  Bathroom Accessibility: Accessible  Home Equipment: Reacher  Has the patient had two or more falls in the past year or any fall with injury in the past year?: No  ADL Assistance: [facility] [address] [address]: Independent  Homemaking Responsibilities: Yes  Meal Prep Responsibility: Primary (Shared responsibility with  (uses microwave and eats out a lot). )  Laundry Responsibility: Primary (Shared responsibility with .)  Cleaning Responsibility: Primary (Shared responsibility with .)  Bill Paying/Finance Responsibility: Secondary (Pt's  manages most of the bills online.)  Shopping Responsibility: Primary (Shared responsibility with .)  Dependent Care Responsibility: Primary (Pt cares for Nayana Gentle (new toy poodle puppy). )  Health Care Management: Primary (Pt was taking 3 medications at home from the bottles.)  Ambulation Assistance: Independent  Transfer Assistance: Independent (Pt reports difficulty with car transfers, however able to do it without assist.)  Active : No  Patient's  Info: Lost drivers license and has to retest.  Mode of Transportation: Car  Education: HS  Type of Occupation: Pt is a missionary, has a boat to transport doctors and nurses and worked in [employer] on the [facility] [email or web address],Third Floor: Pt enjoys reading.   Brenda Spouse manages finances. Pt and spouse share grocery shopping, Judaism,  IADL Comments: Pt was taking 3 medications at home from the bottles. Pt cares for new [address], toy poodle. Additional Comments: Pt sleeps in regular flat bed. Objective                                                                                    Goals:  (Update in navigator)  Short Term Goals  Time Frame for Short Term Goals: 7 tx days:  Short Term Goal 1: Pt will complete OOB transfers Ind. Short Term Goal 2: Pt will ambulate >/=150 ft over level surface and at least 10 ft of uneven surface Ind. Short Term Goal 3: Pt will ascend/descend curb step using SPC and 1 flight of stairs using railing/s Mod Ind. Short Term Goal 4: Pt will complete object retrieval using reacher Mod Ind.:   :        Plan of Care                                                                              Times per week: 5 days per week for a minimum of 60 minutes/day plus group as appropriate for 60 minutes.   Treatment to include Current Treatment Recommendations: Strengthening, Balance training, Functional mobility training, Transfer training, Endurance training, Gait training, Stair training, Pain management, Home exercise program, Safety education & training, Patient/Caregiver education & training, Therapeutic activities    Electronically signed by   Yecenia Wolff, PTA #[id number]  10/8/2022, 12:57 PM

## 2022-10-08 NOTE — PROGRESS NOTES
Physical Rehabilitation: OCCUPATIONAL THERAPY     [x] daily progress note       [] discharge       Patient Name:  Felicity Camacho   :  1940 MRN: [id number]  Room:  [address] [place], [place] [address] Date of Admission: 10/4/2022  Rehabilitation Diagnosis:   Metabolic encephalopathy [B29.79]       Date 10/8/2022       Day of ARU Week:  5   Time IN//1040;  1340/1420   Individual Tx Minutes 80 + 40   Group Tx Minutes    Co-Treat Minutes    Concurrent Tx Minutes    TOTAL Tx Time Mins 120   Variance Time    Variance Time []   Refusal due to:     []   Medical hold/reason:    []   Illness   []   Off Unit for test/procedure  []   Extra time needed to complete task  []   Therapeutic need  []   Other (specify):   Restrictions Restrictions/Precautions: Fall Risk, General Precautions         Communication with other providers: [x]   OK to see per nursing:     []   Spoke with team member regarding:      Subjective observations and cognitive status: Patient up getting ready for therapy in room independently, pleasant and agreeable to therapy, declines shower offered       Pain level/location:   6 /10       Location: headache, nursing notified and pain medications given at beginning of therapy    Discharge recommendations  Anticipated discharge date:  10/10  Destination: []home alone   []home alone w assist prn   [x] home w/ family    [] Continuous supervision       []SNF    [] Assisted living     [] Other:   Continued therapy: [x]HHC OT  []OUTPATIENT  OT   [] No Further OT  Equipment needs: None        ADLs:  Toile[address] needed: Supervision or touching assistance  Comment: SBA to manage clothing in stance and able to perform perineal hygiene seated after episode of urination. CARE Score: 4  Discharge Goal: Independent      Toilet Transfers: Toilet Transfer  Assistance needed: Independent  Comment: CG/SBA with use of 2WW and grab bars.   CARE Score: 6  Discharge Goal: Independent  Device Used:    [x] Standard Toilet         [x]   Grab Bars           []  Bedside Commode       []   Elevated Toilet          []   Other:        Bed Mobility:           [x]   Pt received out of bed   Rolling R/L:    Scooting:    Supine --> Sit:    Sit --> Supine:      Transfers:    Sit--> Stand:   Ind  Stand --> Sit:   Ind  Stand-Pivot:   Ind  Other:    Assistive device required for transfer:   No device       Functional Mobility:  throughout ARU times 2 prior to and following therapy session; PM SESSION: throughout ARU to and from room beginning and end of therapy session     Assistance:  Ind  Device:   []   Rolling Walker     []   Standard Walker []   Wheelchair        []   [employer]       []   4-Wheeled [place]         []   Cardiac Walker       [x]   Other:  No device              Additional Therapeutic activities/exercises completed this date:     []   ADL Training    [x]   Balance/Postural training Patient instructed in standing tolerance/balance task at number target wall with   2# weighted ball  times 5 while reaching outside of JOSE and crossing midline     [x]   Bed/Transfer Training   [x]   Endurance Training  patient instructed in bilateral reciprocal patterned movement for 15 minutes while seated with mod resistance requiring no rest breaks to increase strength and endurance for facilitation off ADL and mobility tasks at community reentry level    []   Neuromuscular Re-ed   []   Nu-step:  Time:        Level:         #Steps:       []   Rebounder:    []  Seated     []  Standing        []   Supine Ther Ex (reps/sets):     [x]   Seated Ther Ex (reps/sets):  Patient instructed in BUE exercises with 3# dowel and 2# dumbbell  all planes and all joints to increase strength and endurance to facilitate IADL and community reentry level mobility tasks    []   Standing Ther Ex (reps/sets):     [x]   Other: PM SESSION: patient instructed in standing tolerance/balance task at matching cards board, patient stands for 8 minutes while reaching outside of JOSE and crossing midline    Patient instructed in reaching task at therapy gym/Kitchen level c patient finding and reaching for bean bags placed  in cabinets both above and below JOSE reaching to ground level, maneuvering around objects to reach for item, problem solving safest way to get to item to retrieve it. Patient finds 15 bean bags throughout therapy gym and kitchen and  is independent when completing task           Comments: Patient c/o pain and bleeding to hemorrhoids c frequent bowel movements 2* to patient taking stool softeners to avoid constipation. Nursing notified and will look into what can be done       Patient/Caregiver Education and Training:   [x]   Adaptive Equipment Use  [x]   Bed Mobility/Transfer Technique/Safety  [x]   Energy Conservation Tips  []   Family training  [x]   Postural Awareness  []   Safety During Functional Activities  []   Reinforced Patient's Precautions   []   Progress was updated and reviewed in Rehabtracker with patient and/or family this         date.     Treatment Plan for Next Session: Patient plan to dc Monday; patient has met or exceeded all goals         Treatment/Activity Tolerance:   [x] Tolerated treatment with no adverse effects    [] Patient limited by fatigue  [] Patient limited by pain   [] Patient limited by medical complications:    [] Adverse reaction to Tx:   [] Significant change in status    Safety:       []  bed alarm set    []  chair alarm set    []  Pt refused alarms                []  Telesitter activated      [x]  Gait belt used during tx session      [x]other:  Patient Ind in room      Number of Minutes/Billable Intervention  Therapeutic Exercise 25   ADL Self-care    Neuro Re-Ed    Therapeutic Activity 55 + 40   Group    Other:    TOTAL 120       Social History  Social/Functional History  Lives With: Spouse (Pt lives with  - Reny Cueva who is in good health)  Type of Home: House  Home Layout: One level  Home Access: Level entry  Bathroom Shower/Tub:  (Walk in tub with built in seat and handheld shower head.)  Bathroom Toilet: Standard  Bathroom Equipment: Grab bars in shower, Hand-held shower, Built-in shower seat  Bathroom Accessibility: Accessible  Home Equipment: Reacher  Has the patient had two or more falls in the past year or any fall with injury in the past year?: No  ADL Assistance: [address]: Independent  Homemaking Responsibilities: Yes  Meal Prep Responsibility: Primary (Shared responsibility with  (uses microwave and eats out a lot). )  Laundry Responsibility: Primary (Shared responsibility with .)  Cleaning Responsibility: Primary (Shared responsibility with .)  Bill Paying/Finance Responsibility: Secondary (Pt's  manages most of the bills online.)  Shopping Responsibility: Primary (Shared responsibility with .)  Dependent Care Responsibility: Primary (Pt cares for Waldemar Guadarrama (new toy poodle puppy). )  Health Care Management: Primary (Pt was taking 3 medications at home from the bottles.)  Ambulation Assistance: Independent  Transfer Assistance: Independent (Pt reports difficulty with car transfers, however able to do it without assist.)  Active : No  Patient's  Info: Lost drivers license and has to retest.  Mode of Transportation: Car  Education: HS  Type of Occupation: Pt is a missionary, has a boat to transport doctors and nurses and worked in [employer] on the [address] [place] [address],Third Floor: Pt enjoys reading. Puppy Miranda, Spouse manages finances. Pt and spouse share grocery shopping, Amish,  IADL Comments: Pt was taking 3 medications at home from the bottles. Pt cares for new [address], toy poodle. Additional Comments: Pt sleeps in regular flat bed.     Objective                                                                                    Goals:  (Update in navigator)  Short Term Goals  Time Frame for Short Term Goals: STGs=LTGs:  Long Term Goals  Time Frame for Long Term Goals : 7-10 days or until d/c. Long Term Goal 1: Pt will complete oral hygiene and grooming tasks c Mod I.  Long Term Goal 2: Pt will complete total body bathing c AE PRN and Mod I.  Long Term Goal 3: Pt will complete UB dressing c IND. Long Term Goal 4: Pt will complete LB dressing c AE PRN and Mod I.  Long Term Goal 5: Pt will doff/don footwear c AE PRN and Mod I. Additional Goals?: Yes  Long Term Goal 6: Pt will complete toileting c Mod I.  Long Term Goal 7: Pt will perform functional transfers (bed, chair, toilet, shower) c DME PRN and Mod I.  Long Term Goal 8: Pt will perform therex/therax to facilitate an increase in strength/endurance (c emphasis on dynamic standing balance/tolerance > 8 mins) c Mod I.  Long Term Goal 9: Pt will perform home management tasks c Mod I.:        Plan of Care                                                                              Times per week: 5 days per week for a minimum of 60 minutes/day plus group as appropriate for 60 minutes.   Treatment to include Occupational Therapy Plan  Current Treatment Recommendations: Strengthening, Balance training, Functional mobility training, Endurance training, Pain management, Safety education & training, Patient/Caregiver education & training, Equipment evaluation, education, & procurement, Positioning, Self-Care / ADL, Home management training, Cognitive/Perceptual training    Electronically signed by   WILIAN Cat,  10/8/2022, 6:40 AM

## 2022-10-09 PROCEDURE — 94664 DEMO&/EVAL PT USE INHALER: CPT

## 2022-10-09 PROCEDURE — 6370000000 HC RX 637 (ALT 250 FOR IP): Performed by: PHYSICAL MEDICINE & REHABILITATION

## 2022-10-09 PROCEDURE — 1280000000 HC REHAB R&B

## 2022-10-09 PROCEDURE — 6360000002 HC RX W HCPCS: Performed by: PHYSICAL MEDICINE & REHABILITATION

## 2022-10-09 PROCEDURE — 94761 N-INVAS EAR/PLS OXIMETRY MLT: CPT

## 2022-10-09 RX ADMIN — DOCUSATE SODIUM 100 MG: 100 CAPSULE, LIQUID FILLED ORAL at 08:38

## 2022-10-09 RX ADMIN — METHYLPREDNISOLONE 4 MG: 4 TABLET ORAL at 06:26

## 2022-10-09 RX ADMIN — SENNOSIDES 8.6 MG: 8.6 TABLET, FILM COATED ORAL at 21:06

## 2022-10-09 RX ADMIN — RIVAROXABAN 20 MG: 20 TABLET, FILM COATED ORAL at 08:38

## 2022-10-09 RX ADMIN — AMIODARONE HYDROCHLORIDE 100 MG: 200 TABLET ORAL at 08:38

## 2022-10-09 RX ADMIN — LACTULOSE 20 G: 10 SOLUTION ORAL at 21:06

## 2022-10-09 RX ADMIN — BUTALBITAL, ACETAMINOPHEN AND CAFFEINE 1 TABLET: 50; 325; 40 TABLET ORAL at 08:46

## 2022-10-09 RX ADMIN — BUTALBITAL, ACETAMINOPHEN AND CAFFEINE 1 TABLET: 50; 325; 40 TABLET ORAL at 21:06

## 2022-10-09 ASSESSMENT — PAIN DESCRIPTION - ONSET: ONSET: ON-GOING

## 2022-10-09 ASSESSMENT — PAIN SCALES - WONG BAKER
WONGBAKER_NUMERICALRESPONSE: 0

## 2022-10-09 ASSESSMENT — PAIN SCALES - GENERAL
PAINLEVEL_OUTOF10: 0
PAINLEVEL_OUTOF10: 6
PAINLEVEL_OUTOF10: 6
PAINLEVEL_OUTOF10: 8

## 2022-10-09 ASSESSMENT — PAIN DESCRIPTION - FREQUENCY: FREQUENCY: INTERMITTENT

## 2022-10-09 ASSESSMENT — PAIN DESCRIPTION - LOCATION
LOCATION: HEAD
LOCATION: HEAD

## 2022-10-09 ASSESSMENT — PAIN DESCRIPTION - PAIN TYPE: TYPE: CHRONIC PAIN

## 2022-10-09 ASSESSMENT — PAIN - FUNCTIONAL ASSESSMENT: PAIN_FUNCTIONAL_ASSESSMENT: ACTIVITIES ARE NOT PREVENTED

## 2022-10-09 ASSESSMENT — PAIN DESCRIPTION - DESCRIPTORS: DESCRIPTORS: ACHING

## 2022-10-09 ASSESSMENT — PAIN DESCRIPTION - ORIENTATION: ORIENTATION: MID

## 2022-10-09 NOTE — PROGRESS NOTES
Anastacia Welsh    : 1940  Acct #: [de-identified]  MRN: [id number]              PM&R Progress Note      Admitting diagnosis: Encephalopathy ( [address])     Comorbid diagnoses impacting rehabilitation: Generalized weakness, gait disturbance, toxic reaction to baclofen, Katz with hyperammonemia, permanent atrial fibrillation, essential hypertension, chronic back pain, rheumatoid arthritis. Chief complaint: Fatigue with activity. Some positional dizziness. Prior (baseline) level of function: Independent. Current level of function:         Current  [facility] and Goals:   Occupational Therapy:    Short Term Goals  Time Frame for Short Term Goals: STGs=LTGs :   Long Term Goals  Time Frame for Long Term Goals : 7-10 days or until d/c. Long Term Goal 1: Pt will complete oral hygiene and grooming tasks c Mod I.  Long Term Goal 2: Pt will complete total body bathing c AE PRN and Mod I.  Long Term Goal 3: Pt will complete UB dressing c IND. Long Term Goal 4: Pt will complete LB dressing c AE PRN and Mod I.  Long Term Goal 5: Pt will doff/don footwear c AE PRN and Mod I. Additional Goals?: Yes  Long Term Goal 6: Pt will complete toileting c Mod I.  Long Term Goal 7: Pt will perform functional transfers (bed, chair, toilet, shower) c DME PRN and Mod I.  Long Term Goal 8: Pt will perform therex/therax to facilitate an increase in strength/endurance (c emphasis on dynamic standing balance/tolerance > 8 mins) c Mod I.  Long Term Goal 9: Pt will perform home management tasks c Mod I. :                                       Eating: Eating  Assistance Needed: Independent  Comment: X  CARE Score: 6  Discharge Goal: Independent       Oral Hygiene: Oral Hygiene  Assistance Needed: Independent  Comment: IND to perform oral hygiene in stance at sink. CARE Score: 6  Discharge Goal: Independent    UB/LB Bathing: Shower/Bathe Self  Assistance Needed: Independent  Comment: Pt completed full shower with independence.   CARE Score: 6  Discharge Goal: Independent  Chair/Bed-to-Chair Transfer  Assistance Needed: Independent  Comment: Ind  CARE Score: 6  Discharge Goal: Independent     Car Transfer  Assistance Needed: Independent  Comment: pt able to step in steadily using car as external support and get up Ind  CARE Score: 6  Discharge Goal: Independent    Ambulation:    Walking Ability  Does the Patient Walk?: Yes     Walk 10 Feet  Assistance Needed: Independent  Comment: Ind without AD (pt with short stride length but gait quality was consistently steady)  CARE Score: 6  Discharge Goal: Independent     Walk 50 Feet with Two Turns  Assistance Needed: Independent  Comment: Ind without AD (pt with short stride length but gait quality was consistently steady)  CARE Score: 6  Discharge Goal: Independent     Walk 150 Feet  Assistance Needed: Independent  Comment: Ind without AD (pt with short stride length but gait quality was consistently steady); pt was able to ambulate 384 ft consecutively  CARE Score: 6  Discharge Goal: Independent     Walking 10 Feet on Uneven Surfaces  Assistance Needed: Independent  Comment: Ind  CARE Score: 6  Discharge Goal: Independent     1 Step (Curb)  Assistance Needed: Supervision or touching assistance  Comment: SBA with SPC  CARE Score: 4  Discharge Goal: Independent     4 Steps  Assistance Needed: Independent  Comment: Mod Ind with L ascending/descending rail with consistent step-through pattern on ascent/descent  CARE Score: 6  Discharge Goal: Independent     12 Steps  Assistance Needed: Independent  Comment:  Mod Ind with L ascending/descending rail with consistent step-through pattern on ascent/descent of regular staircase  CARE Score: 6  Discharge Goal: Independent       Wheelchair:  w/c Ability: Wheelchair Ability  Uses a Wheelchair and/or Scooter?: No                Balance:        Object: Picking Up Object  Assistance Needed: Independent  Comment: pt steady in unsupported stance but required use of reacher to complete task Mod Ind  CARE Score: 6  Discharge Goal: Independent    I      Exam:    Blood pressure 135/85, pulse 89, temperature 97.9 °F (36.6 °C), temperature source Oral, resp. rate 18, height 5' 3\" (1.6 m), weight 161 lb 13.1 oz (73.4 kg), SpO2 95 %. General: Upright in the bedside chair with legs elevated. Easily distracted. Following directions a bit more promptly. HEENT: No signs of trauma to her head or neck. Visual fields seem full. Neck movement appropriate for age. No JVD. Pulmonary: No wheezing, coughing or rhonchi. Cardiac: Heart rate is controlled but occasional premature beats were present. Abdomen: Patient's abdomen is soft and nondistended. Bowel sounds were present throughout. There was no rebound, guarding or masses noted. Upper extremities: No new bruising or swelling. Lower extremities: No signs of DVT. Trace reflexes. Normal tone. Sitting balance was fair. Standing balance was poor. Lab Results   Component Value Date    WBC 7.5 10/07/2022    HGB 12.0 (L) 10/07/2022    HCT 37.0 10/07/2022    MCV 97.1 10/07/2022     10/07/2022     No results found for: INR, PROTIME  Lab Results   Component Value Date    CREATININE 0.6 10/07/2022    BUN 13 10/07/2022     10/07/2022    K 4.1 10/07/2022     10/07/2022    CO2 25 10/07/2022     No results found for: ALT, AST, GGT, ALKPHOS, BILITOT    Expected length of stay  prior to a supervised level of function for discharge home with a walker and [facility] OT/PT is 10/10/2022. Recommendations:    Metabolic encephalopathy with gait disturbance: She benefits from rest breaks to endure the activities of her daily occupational and physical therapy with speech-language pathology. Blood count checked by the end of the week. Providing her treatment in a calm and consistent environment to improve retention of new information and to reduce distractions.   Offering her written and verbal instructions when possible. Trying to involve caregivers with training is much as possible. We must minimize medication use, monitor her GELLER and provide nutritional support. She benefits from adaptive equipment training, pulmonary hygiene measures and DVT prophylaxis. Verbal cues and minimum physical assistance for transfers. DVT prophylaxis: Prior to rehab, she had resumed her Xarelto for her atrial fibrillation. This should protect her against new DVT. I must periodically monitor her hemoglobin while on this medication. Weightbearing activities are slowly improving daily. GI prophylaxis is available. No new bruising or swelling. GELLER: Lactulose 20 g 3 times daily and monitoring her ammonia level should her mentation dictate this. Avoiding acetaminophen. Uncontrolled pain: We must minimize use of acetaminophen with her liver disease. Steroid taper ongoing. Diathermy and progressive mobilization. Range of motion exercises. Low-dose tramadol has not been needed. Minimal headaches. Atrial fibrillation: She has now anticoagulated with Xarelto. Cordarone for rate control. Vital signs are checked at rest and with activity. Daily weights do not reveal any decompensation. Hypertension: Target systolic blood pressures [id number]. Currently her blood pressure is within the target range without medications. Monitoring closely. Rheumatoid arthritis: This disease is likely contributing to her arthralgias. Her steroid taper will likely help control her symptoms in the short-term. Long-term management will be deferred to her PCP. Counseling and Coordination of Care: In care conference today I met with the patient's OT, PT, RN and . We discussed the patient's problems, progress and prognosis. Disposition issues were clarified and plans were established for ongoing rehabilitation efforts beyond the ARU stay. I reviewed this information with the patient during a second distinct visit with the patient. More than half of the total time of 35 minutes spent with the patient involved counseling and coordination of care.

## 2022-10-10 VITALS
DIASTOLIC BLOOD PRESSURE: 84 MMHG | WEIGHT: 160.94 LBS | HEART RATE: 96 BPM | RESPIRATION RATE: 15 BRPM | OXYGEN SATURATION: 94 % | HEIGHT: 63 IN | TEMPERATURE: 97.3 F | SYSTOLIC BLOOD PRESSURE: 145 MMHG | BODY MASS INDEX: 28.52 KG/M2

## 2022-10-10 PROCEDURE — 97116 GAIT TRAINING THERAPY: CPT

## 2022-10-10 PROCEDURE — 94761 N-INVAS EAR/PLS OXIMETRY MLT: CPT

## 2022-10-10 PROCEDURE — 97530 THERAPEUTIC ACTIVITIES: CPT

## 2022-10-10 PROCEDURE — 99239 HOSP IP/OBS DSCHRG MGMT >30: CPT | Performed by: PHYSICAL MEDICINE & REHABILITATION

## 2022-10-10 PROCEDURE — 94150 VITAL CAPACITY TEST: CPT

## 2022-10-10 PROCEDURE — 97535 SELF CARE MNGMENT TRAINING: CPT

## 2022-10-10 PROCEDURE — 6370000000 HC RX 637 (ALT 250 FOR IP): Performed by: PHYSICAL MEDICINE & REHABILITATION

## 2022-10-10 RX ORDER — SENNA PLUS 8.6 MG/1
1 TABLET ORAL NIGHTLY PRN
Qty: 30 TABLET | Refills: 0 | COMMUNITY
Start: 2022-10-10 | End: 2022-11-09

## 2022-10-10 RX ORDER — LACTULOSE 10 G/15ML
20 SOLUTION ORAL DAILY
Status: DISCONTINUED | OUTPATIENT
Start: 2022-10-10 | End: 2022-10-10 | Stop reason: HOSPADM

## 2022-10-10 RX ORDER — LACTULOSE 10 G/15ML
20 SOLUTION ORAL DAILY
Qty: 1 EACH | Refills: 0 | Status: SHIPPED | OUTPATIENT
Start: 2022-10-11 | End: 2022-11-10

## 2022-10-10 RX ORDER — PSEUDOEPHEDRINE HCL 30 MG
100 TABLET ORAL 2 TIMES DAILY PRN
COMMUNITY
Start: 2022-10-10

## 2022-10-10 RX ADMIN — BUTALBITAL, ACETAMINOPHEN AND CAFFEINE 1 TABLET: 50; 325; 40 TABLET ORAL at 03:06

## 2022-10-10 RX ADMIN — BUTALBITAL, ACETAMINOPHEN AND CAFFEINE 1 TABLET: 50; 325; 40 TABLET ORAL at 10:13

## 2022-10-10 RX ADMIN — AMIODARONE HYDROCHLORIDE 100 MG: 200 TABLET ORAL at 08:44

## 2022-10-10 RX ADMIN — RIVAROXABAN 20 MG: 20 TABLET, FILM COATED ORAL at 08:44

## 2022-10-10 RX ADMIN — DOCUSATE SODIUM 100 MG: 100 CAPSULE, LIQUID FILLED ORAL at 08:44

## 2022-10-10 ASSESSMENT — PAIN DESCRIPTION - DESCRIPTORS
DESCRIPTORS: ACHING
DESCRIPTORS: ACHING

## 2022-10-10 ASSESSMENT — PAIN DESCRIPTION - LOCATION
LOCATION: HEAD
LOCATION: HEAD

## 2022-10-10 ASSESSMENT — PAIN SCALES - GENERAL
PAINLEVEL_OUTOF10: 8
PAINLEVEL_OUTOF10: 7
PAINLEVEL_OUTOF10: 6
PAINLEVEL_OUTOF10: 8

## 2022-10-10 ASSESSMENT — PAIN DESCRIPTION - ORIENTATION: ORIENTATION: INNER;ANTERIOR

## 2022-10-10 NOTE — PROGRESS NOTES
[facility] Discharge Assessment    Transportation  \"Has lack of transportation kept you from medical appointments, meetings, work, or from getting things needed for daily living? \"Check all that apply:  [] A.  Yes, it has kept me from medical appointments or from getting my medications  [] B.  Yes, it has kept me from non-medical meetings, appointments, work, or from getting things that I need  [x] C.  No  [] X. Patient unable to respond    Provision of Current Reconciled Medication List to Subsequent Provider at Discharge  [] No, current reconciled medication list not provided to the subsequent provider. [x] Yes, current reconciled medication list provided to the subsequent provider. (**Select route of transmission below**)   [] Via Electronic Health Record   [] [employer]  [x] Verbal (e.g. in person, telephone, video conferencing)  [x] Paper-based (e.g. fax, copies, printouts)   [] Other Methods (e.g. texting, email, CDs)    Provision of Current Reconciled Medication List to Patient at Discharge  [] No, current reconciled medication list not provided to the patient, family and/or caregiver. [x] Yes, current reconciled medication list provided to the patient, family and/or caregiver.  (**Select route of transmission below**)   [] Via Electronic Health Record (e.g., electronic access to patient portal)   [] Via [employer]  [x] Verbal (e.g. in person, telephone, video conferencing)  [x] Paper-based (e.g. fax, copies, printouts)   [] Other Methods (e.g. texting, email, CDs)    Health Literacy  \"How often do you need to have someone help you when you read instructions, pamphlets, or other written material from your doctor or pharmacy? \"  []  0. Never  [x]  1. Rarely  []  2. Sometimes  []  3. Often  []  4. Always  []  8. Patient unable to respond    Signs and Symptoms of Delirium  A.   Acute Onset Mental Status Change - Is there evidence of an acute change in mental status from the patient's baseline? [x] 0. No  [] 1. Yes    B. Inattention - Did the patient have difficulty focusing attention, for example being easily distractible or having difficulty keeping track of what was being said? [x]  0. Behavior not present  []  1. Behavior continuously present, does not fluctuate  []  2. Behavior present, fluctuates (comes and goes, changes in severity)    C. Disorganized thinking - Was the patient's thinking disorganized or incoherent (rambling or irrelevant conversation, unclear or illogical flow of ideas, or unpredictable switching from subject to subject)? [x]  0. Behavior not present  []  1. Behavior continuously present, does not fluctuate  []  2. Behavior present, fluctuates (comes and goes, changes in severity)    D. Altered level of consciousness - Did the patient have altered level of consciousness as indicated by any of the following criteria? Vigilant - startled easily to any sound or touch  Lethargic - repeatedly dozed off while being asked questions, but responded to voice or touch  Stuporous - very difficulty to arouse and keep aroused for the interview  Comatose - could not be aroused  [x]  0. Behavior not present  []  1. Behavior continuously present, does not fluctuate  []  2. Behavior present, fluctuates (comes and goes, changes in severity)    Mood    \"Over the last 2 weeks, have you been bothered by any of the following problems?\" 1. Symptom Presence    0 = No  1 = Yes  9 = No Response 2. Symptom Frequency    0 = Never or 1 day  1 = 2-6 days (several days)  2 = 7-11 days (half or more of the days)  3 = 12-14 days (nearly every day)  **Leave blank if 'No Reponse'**      Enter scores in boxes    Column 1 Column 2   Little interest or pleasure in doing things   [x] 0. No  [] 1. Yes  [] 9. No Response [] 0.   Never or one day  [] 1.  2-6 days (several days)  [] 2.  7-11 days (half or more of days)  [] 3.  12-14 days (nearly every day) Feeling down, depressed, or hopeless   [x] 0. No  [] 1. Yes  [] 9. No Response [] 0. Never or one day  [] 1.  2-6 days (several days)  [] 2.  7-11 days (half or more of days)  [] 3.  12-14 days (nearly every day)   **If either A or B in column 2 is coded 2 or 3, CONTINUE asking the questions below. If not, END the interview. **     Trouble falling or staying asleep, or sleeping too much   [x] 0. No  [] 1. Yes  [] 9. No Response [] 0. Never or one day  [] 1.  2-6 days (several days)  [] 2.  7-11 days (half or more of days)  [] 3.  12-14 days (nearly every day   Feeling tired or having little energy   [x] 0. No  [] 1. Yes  [] 9. No Response [] 0. Never or one day  [] 1.  2-6 days (several days)  [] 2.  7-11 days (half or more of days)  [] 3.  12-14 days (nearly every day   Poor appetite or overeating     [x] 0. No  [] 1. Yes  [] 9. No Response [] 0. Never or one day  [] 1.  2-6 days (several days)  [] 2.  7-11 days (half or more of days)  [] 3.  12-14 days (nearly every day   Feeling bad about yourself - or that you are a failure or have let yourself or your family down   [x] 0. No  [] 1. Yes  [] 9. No Response [] 0. Never or one day  [] 1.  2-6 days (several days)  [] 2.  7-11 days (half or more of days)  [] 3.  12-14 days (nearly every day   Trouble concentrating on things, such as reading the newspaper or watching television   [x] 0. No  [] 1. Yes  [] 9. No Response [] 0. Never or one day  [] 1.  2-6 days (several days)  [] 2.  7-11 days (half or more of days)  [] 3.  12-14 days (nearly every day   Moving or speaking so slowly that other people could have noticed. Or the opposite- being so fidgety or restless that you have been moving around a lot more than usual.   [x] 0. No  [] 1. Yes  [] 9. No Response [] 0.   Never or one day  [] 1.  2-6 days (several days)  [] 2.  7-11 days (half or more of days)  [] 3.  12-14 days (nearly every day   Thoughts that you would be better off dead, or of hurting yourself in some way. [x] 0. No  [] 1. Yes  [] 9. No Response [] 0. Never or one day  [] 1.  2-6 days (several days)  [] 2.  7-11 days (half or more of days)  [] 3.  12-14 days (nearly every day     Social Isolation  \"How often do you feel lonely or isolated from those around you? \"  [] 0. Never  [x] 1. Rarely  [] 2. Sometimes  [] 3. Often  [] 4. Always  [] 8. Patient unable to respond    Pain Effect on Sleep  \"Over the past 5 days, how much of the time has pain made it hard for you to sleep at night? \"  []  0. Does not apply - I have not had any pain or hurting in the past 5 days  []  1. Rarely or not at all  [x]  2. Occasionally  []  3. Frequently  []  4. Almost constantly  []  8. Unable to answer  **If the patient answers \"0. Does not apply\" to this question, skip the next two \"Pain Effect. Familia Mering Familia Mering \" questions**    Pain Interference with Therapy Activities  \"Over the past 5 days, how often have you limited your participation in rehabilitation therapy sessions due to pain? \"  []  0. Does not apply - I have not received rehabilitation therapy in the past 5 days  [x]  1. Rarely or not at all  []  2. Occasionally  []  3. Frequently  []  4. Almost constantly  []  8. Unable to answer    Pain Interference with Day-to-Day Activities: \"Over the past 5 days, how often have you limited your day-to-day activities (excluding rehabilitation therapy session)? \"  []  1. Rarely or not at all  [x]  2. Occasionally  []  3. Frequently  []  4. Almost constantly  []  8. Unable to answer    Nutritional Approaches  Last 7 Days - Check all of the following nutritional approaches that were received within the last 7 days:  []  A. Parenteral/IV feeding  []  B. Feeding tube (e.g., nasogastric or abdominal (PEG))  []  C. Mechanically altered diet - requires change in texture of food or liquids (e.g., pureed food, thickened liquids)  []  D. Therapeutic diet (e.g., low salt, diabetic, low cholesterol)  [x]  EZEKIEL. None of the above    At time of Discharge - Check all of the following nutritional approaches that were being received at discharge:  []  A. Parenteral/IV feeding  []  B. Feeding tube (e.g., nasogastric or abdominal (PEG))  []  C. Mechanically altered diet - requires change in texture of food or liquids (e.g., pureed food, thickened liquids)  []  D. Therapeutic diet (e.g., low salt, diabetic, low cholesterol  [x]  Z. None of the above      Special Treatments, Procedures, and Programs    Check all of the following treatments, procedures, and programs that apply at discharge. At Discharge (check all that apply)   Cancer Treatments   A1. Chemotherapy []           A2. IV []           A3. Oral []           A10. Other []   B1. Radiation []   Respiratory Therapies   C1. Oxygen Therapy []           C2. Continuous []           C3. Intermittent []           C4. High-concentration []   D1. Suctioning []           D2. Scheduled []           D3. As needed []   E1. Tracheostomy Care []   F1. Invasive Mechanical Ventilator (ventilator or respirator) []   G1. Non-invasive Mechanical Ventilator []           G2. BiPAP []           G3. CPAP []   Other   H1. IV Medications []           H2. Vasoactive medications []           H3. Antibiotics []           H4. Anticoagulation []           H10. Other []   I1. Transfusions []   J1. Dialysis []           J2. Hemodialysis []           J3. Peritoneal dialysis []   O1. IV access []           O2. Peripheral []           O3. Midline []           O4.  Central (PICC, tunneled, port) []      None of the above (select if no Cancer, Respiratory, or Other boxes are checked) [x]

## 2022-10-10 NOTE — PROGRESS NOTES
Patient received discharge instructions with written prescription of Lactulose. Educated about importance of taking lactulose at home and educated not to take tylenol as well. Verbalized understand, Kimberly Schmittmin out with  at side with no issues.

## 2022-10-10 NOTE — PROGRESS NOTES
Occupational Therapy    Physical Rehabilitation: OCCUPATIONAL THERAPY     [x] daily progress note       [x] discharge       Patient Name:  Renetta Michel   :  1940 MRN: [id number]  Room:  [address] [place] [address] Date of Admission: 10/4/2022  Rehabilitation Diagnosis:   Metabolic encephalopathy [X03.34]       Date 10/10/2022       Day of ARU Week:  7   Time IN//825   Individual Tx Minutes 55   Group Tx Minutes    Co-Treat Minutes    Concurrent Tx Minutes    TOTAL Tx Time Mins 55   Variance Time -5   Variance Time []   Refusal due to:     []   Medical hold/reason:    []   Illness   []   Off Unit for test/procedure  []   Extra time needed to complete task  []   Therapeutic need  [x]   Other (specify): Full time not needed, discharging this date. Restrictions Restrictions/Precautions: Fall Risk, General Precautions         Communication with other providers: [x]   OK to see per nursing:     []   Spoke with team member regarding:      Subjective observations and cognitive status: Pt in supine asleep upon entrance, easily awakened, pleasant and agreeable to therapy. Pain level/location:    /10       Location: Pt denies pain. Discharge recommendations  Anticipated discharge date:  10/10  Destination: []home alone   []home alone w assist prn   [x] home w/ family    [] Continuous supervision       []SNF    [] Assisted living     [] Other:   Continued therapy: []HHC OT  []OUTPATIENT  OT   [x] No Further OT  Equipment needs: None       ADLs:    Eating: Eating  Assistance Needed: Independent  Comment: X  CARE Score: 6  Discharge Goal: Independent       Oral Hygiene: Oral Hygiene  Assistance Needed: Independent  Comment: IND to perform oral hygiene in stance at sink. CARE Score: 6  Discharge Goal: Independent    UB/LB Bathing: Shower/Bathe Self  Assistance Needed: Independent  Comment: Pt completed full shower with independence.   CARE Score: 6  Discharge Goal: Independent    UB Dressing: Upper Body Dressing  Assistance Needed: Independent  Comment: IND, able to retrieve clothing and doff/don bra and pullover shirt. CARE Score: 6  Discharge Goal: Independent         LB Dressing: Lower Body Dressing  Assistance Needed: Independent  Comment: IND, able to retrieve clothing and doff/don underwear and pants. CARE Score: 6  Discharge Goal: Independent    Donning and [place] Footwear: Putting On/Taking Off Footwear  Assistance Needed: Independent  Comment: IND to doff/don socks and tennis shoes. CARE Score: 6  Discharge Goal: Independent      Toiletin [address] needed: Independent  Comment: IND to manage clothing and perform perineal hygiene after episode of urination. CARE Score: 6  Discharge Goal: Independent      Toilet Transfers: Toilet Transfer  Assistance needed: Independent  Comment: IND. CARE Score: 6  Discharge Goal: Independent  Device Used:    [x]   Standard Toilet         []   Grab Bars           []  Bedside Commode       []   Elevated Toilet          []   Other:        Bed Mobility:           []   Pt received out of bed   Rolling R/L:  IND  Scooting:  IND  Supine --> Sit:  IND      Transfers:    Sit--> Stand:  IND  Stand --> Sit:   IND  Stand-Pivot:   IND  Other:  Shower bench transfer IND  Assistive device required for transfer:   No device      Functional Mobility:    Assistance: Ind within room. Device:   []   Rolling Walker     []   Standard Júnior Noemi []   Wheelchair        []   Arthur Cypher       []   4-Wheeled Júnior Noemi         []   Cardiac Walker       [x]   Other:  No device      Homemaking Tasks:   Pt able to retrieve clothing from closet and transport to bathroom with IND. Additional Therapeutic activities/exercises completed this date:     [x]   ADL Training - Pt sat sink side to blow dry hair IND.    []   Balance/Postural training     []   Bed/Transfer Training   []   Endurance Training   []   Neuromuscular Re-ed   []   Nu-step:  Time:        Level:         #Steps: []   Rebounder:    []  Seated     []  Standing        []   Supine Ther Ex (reps/sets):     []   Seated Ther Ex (reps/sets):     []   Standing Ther Ex (reps/sets):     []   Other:      Comments: All intervention performed to increase pt's endurance, ax tolerance, balance, and I c ADLs/IADLs and functional transfers/mobility. Patient/Caregiver Education and Training:   []   [employer] Equipment Use  []   Bed Mobility/Transfer Technique/Safety  []   Energy Conservation Tips  []   Family training  []   Postural Awareness  []   Safety During Functional Activities  []   Reinforced Patient's Precautions   []   Progress was updated and reviewed in Rehabtracker with patient and/or family this         date. Treatment Plan for Next Session: Pt to d/c this date. Assessment: This pt demonstrated a positive response to today's treatment as evidenced by QI scores. The patient made excellent progress toward established goals as evidenced by QI scores.        Treatment/Activity Tolerance:   [x] Tolerated treatment with no adverse effects    [] Patient limited by fatigue  [] Patient limited by pain   [] Patient limited by medical complications:    [] Adverse reaction to Tx:   [] Significant change in status    Safety:       []  bed alarm set    []  chair alarm set    []  Pt refused alarms                []  Telesitter activated      []  Gait belt used during tx session      [x]other: Pt IND within room      Number of Minutes/Billable Intervention  Therapeutic Exercise    ADL Self-care 55   Neuro Re-Ed    Therapeutic Activity    Group    Other:    TOTAL 55       Social History  Social/Functional History  Lives With: Spouse (Pt lives with  - Tracey Lloyd who is in good health)  Type of Home: House  Home Layout: One level  Home Access: Level entry  Bathroom Shower/Tub:  (Walk in tub with built in seat and handheld shower head.)  Bathroom Toilet: Standard  Bathroom Equipment: Grab bars in shower, Hand-held shower, Built-in shower seat  Bathroom Accessibility: Accessible  Home Equipment: Reacher  Has the patient had two or more falls in the past year or any fall with injury in the past year?: No  ADL Assistance: [address]: Independent  Homemaking Responsibilities: Yes  Meal Prep Responsibility: Primary (Shared responsibility with  (uses microwave and eats out a lot). )  Laundry Responsibility: Primary (Shared responsibility with .)  Cleaning Responsibility: Primary (Shared responsibility with .)  Bill Paying/Finance Responsibility: Secondary (Pt's  manages most of the bills online.)  Shopping Responsibility: Primary (Shared responsibility with .)  Dependent Care Responsibility: Primary (Pt cares for Radha Haddad (new toy poodle puppy). )  Health Care Management: Primary (Pt was taking 3 medications at home from the bottles.)  Ambulation Assistance: Independent  Transfer Assistance: Independent (Pt reports difficulty with car transfers, however able to do it without assist.)  Active : No  Patient's  Info: Lost drivers license and has to retest.  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 Puppy Miranda, Spouse manages finances. Pt and spouse share grocery shopping, Sikhism,  IADL Comments: Pt was taking 3 medications at home from the bottles. Pt cares for new [address], toy poodle. Additional Comments: Pt sleeps in regular flat bed. Objective                                                                                    Goals:  (Update in navigator)  Short Term Goals  Time Frame for Short Term Goals: STGs=LTGs:  Long Term Goals  Time Frame for Long Term Goals : 7-10 days or until d/c.   Long Term Goal 1: Pt will complete oral hygiene and grooming tasks c Mod I. MET  Long Term Goal 2: Pt will complete total body bathing c AE PRN and Mod I. MET  Long Term Goal 3: Pt will complete UB dressing c IND. MET  Long Term Goal 4: Pt will complete LB dressing c AE PRN and Mod I. MET  Long Term Goal 5: Pt will doff/don footwear c AE PRN and Mod I. MET  Additional Goals?: Yes  Long Term Goal 6: Pt will complete toileting c Mod I.MET  Long Term Goal 7: Pt will perform functional transfers (bed, chair, toilet, shower) c DME PRN and Mod I.MET  Long Term Goal 8: Pt will perform therex/therax to facilitate an increase in strength/endurance (c emphasis on dynamic standing balance/tolerance > 8 mins) c Mod I.MET  Long Term Goal 9: Pt will perform home management tasks c Mod I.:    MET    Plan of Care                                                                              Times per week: 5 days per week for a minimum of 60 minutes/day plus group as appropriate for 60 minutes.   Treatment to include Occupational Therapy Plan  Current Treatment Recommendations: Strengthening, Balance training, Functional mobility training, Endurance training, Pain management, Safety education & training, Patient/Caregiver education & training, Equipment evaluation, education, & procurement, Positioning, Self-Care / ADL, Home management training, Cognitive/Perceptual training    Electronically signed by   ASHISH Lawson, OTR/L #[id number],  10/10/2022, 8:46 AM

## 2022-10-10 NOTE — PROGRESS NOTES
Physical Therapy    [x] daily progress note       [x] discharge       Patient Name:  Chandu Matthews   :  1940 MRN: [id number]  Room:  [address] [place] [address] Date of Admission: 10/4/2022  Rehabilitation Diagnosis:   Metabolic encephalopathy [L66.45]       Date 10/10/2022       Day of ARU Week:  7   Time IN//1040   Individual Tx Minutes 60   Group Tx Minutes    Co-Treat Minutes    Concurrent Tx Minutes    TOTAL Tx Time Mins 60   Variance Time    Variance Time []   Refusal due to:     []   Medical hold/reason:    []   Illness   []   Off Unit for test/procedure  []   Extra time needed to complete task  []   Therapeutic need  []   Other (specify):   Restrictions Restrictions/Precautions  Restrictions/Precautions: Fall Risk, General Precautions      Interdisciplinary communication [x]   Cleared for therapy per nursing     [x]   RN notified about  pt's request for pain meds  []   RN updated on pt performance  []   Spoke with   []   Spoke with OT  []   Spoke with MD  []   Other:    Subjective observations and cognitive status: Pt resting at EOB, alert, reports having a headache and is waiting for the nurse to give her pain meds.      Pain level/location:   7 /10       Location: front part of head    Discharge recommendations  Anticipated discharge date:  10/10/2022  Destination: []home alone   []home alone with assist PRN     [x] home w/ family      [] Continuous supervision  []SNF    [] Assisted living     [] Other:  Continued therapy: []HHC PT  []OUTPATIENT PT   [x] No Further PT  []SNF PT  Caregiver training recommended: []Yes  [] No   Equipment needs: none      PT IRF-STEFFANY scores and goals for discharge assessment:   Roll Left and Right  Assistance Needed: Independent  Comment: Ind in regular bed  CARE Score: 6  Discharge Goal: Independent    Sit to Lying  Assistance Needed: Independent  Comment: Ind in regular bed  CARE Score: 6  Discharge Goal: Independent    Lying to Sitting on Side of Bed  Assistance Needed: Independent  Comment: Ind in regular bed  CARE Score: 6  Discharge Goal: Independent    Sit to Stand  Assistance Needed: Independent  Comment: Ind  CARE Score: 6  Discharge Goal: Independent    Chair/Bed-to-Chair Transfer  Assistance Needed: Independent  Comment: Ind  CARE Score: 6  Discharge Goal: Independent    Toilet Transfer  Assistance needed: Independent  Comment: IND. CARE Score: 6  Discharge Goal: Independent    Car Transfer  Assistance Needed: Independent  Comment: pt able to step in steadily using car as external support and get up Ind  CARE Score: 6  Discharge Goal: Independent    Walk 10 Feet? Walk 10 Feet?: Yes    1 Step  1 Step?: Yes    Picking Up Object  Assistance Needed: Independent  Comment: Mod Ind-Ind (pt able to perform in unsupported stance with/without use of reacher)  CARE Score: 6  Discharge Goal: Independent    Wheelchair Ability  Uses a Wheelchair and/or Scooter?: No      Walking Ability  Does the Patient Walk?: Yes    Walk 10 Feet  Assistance Needed: Independent  Comment: Ind without AD (gait quality consistently steady)  CARE Score: 6  Discharge Goal: Independent    Walk 50 Feet with Two Turns  Assistance Needed: Independent  Comment: Ind without AD (gait quality consistently steady)  CARE Score: 6  Discharge Goal: Independent    Walk 150 Feet  Assistance Needed: Independent  Comment: Ind without AD; gait quality consistently steady  CARE Score: 6  Discharge Goal: Independent    Walking 10 Feet on Uneven Surfaces  Assistance Needed: Independent  Comment: Ind without AD over concrete, slope/ramp, rugs  CARE Score: 6  Discharge Goal: Independent    1 Step (Curb)  Assistance Needed: Independent  Comment: Mod Ind with SPC  CARE Score: 6  Discharge Goal: Independent    4 Steps  Assistance Needed: Independent  Comment:  Mod Ind with L ascending/descending rail with consistent step-through pattern on ascent/descent  CARE Score: 6  Discharge Goal: Independent    12 Steps  Assistance Needed: Independent  Comment: Mod Ind with L ascending/descending rail with consistent step-through pattern on ascent/descent of regular staircase  CARE Score: 6  Discharge Goal: Independent    Patient/Caregiver Education and Training:   []   Role of PT  []   Education about Dx  []   Use of call light for assist   []   HEP provided and explained   [x]   Treatment plan reviewed  []   Home safety  []   Wheelchair mobility/management   []   Body mechanics  []   Bed Mobility/Transfer technique  []   Gait technique/sequencing  []   Proper use of assistive device/adaptive equipment  []   Stair training/Advanced mobility safety and technique  []   Reinforced patient's precautions/mobility while maintaining precautions  []   Postural awareness  []   Family/caregiver training  [x]   Progress was updated and reviewed with patient  this date. [x]   Other: Pt education about benefits of continued walking regimen at home to prevent deconditioning and as a vital component of continued recovery. Treatment Plan for Next Session: discharge to home     Assessment: This pt has met all PT goals and her current functional mobility is safe for return to home and in the community.      Treatment/Activity Tolerance:   [x] Tolerated treatment with no adverse effects    [] Patient limited by fatigue  [] Patient limited by pain   [] Patient limited by medical complications:    [] Adverse reaction to Tx:   [] Significant change in status    Safety:       []  bed alarm set    []  chair alarm set    []  Pt refused alarms                []  Telesitter activated      [x]  Gait belt used during tx session      [x]other: pt Ind in room       Number of Minutes/Billable Intervention  Gait Training 40   Therapeutic Exercise    Neuro Re-Ed    Therapeutic Activity 20   Wheelchair Propulsion    Group    Other:    TOTAL 60     Social History  Social/Functional History  Lives With: Spouse (Pt lives with  - Maximus Spencer who is in good health)  Type of Home: House  Home Layout: One level  Home Access: Level entry  Bathroom Shower/Tub:  (Walk in tub with built in seat and handheld shower head.)  Bathroom Toilet: Standard  Bathroom Equipment: Grab bars in shower, Hand-held shower, Built-in shower seat  Bathroom Accessibility: Accessible  Home Equipment: Reacher  Has the patient had two or more falls in the past year or any fall with injury in the past year?: No  ADL Assistance: [address]: Independent  Homemaking Responsibilities: Yes  Meal Prep Responsibility: Primary (Shared responsibility with  (uses microwave and eats out a lot). )  Laundry Responsibility: Primary (Shared responsibility with .)  Cleaning Responsibility: Primary (Shared responsibility with .)  Bill Paying/Finance Responsibility: Secondary (Pt's  manages most of the bills online.)  Shopping Responsibility: Primary (Shared responsibility with .)  Dependent Care Responsibility: Primary (Pt cares for Tammie Sneddon (new toy poodle puppy). )  Health Care Management: Primary (Pt was taking 3 medications at home from the bottles.)  Ambulation Assistance: Independent  Transfer Assistance: Independent (Pt reports difficulty with car transfers, however able to do it without assist.)  Active : No  Patient's  Info: Lost drivers license and has to retest.  Mode of Transportation: Car  Education: HS  Type of Occupation: Pt is a missionary, has a boat to transport doctors and nurses and worked in [employer] on the [address] [place] [address],Third Floor: Pt enjoys reading. Puppy Miranda, Spouse manages finances. Pt and spouse share grocery shopping, [place],  IADL Comments: Pt was taking 3 medications at home from the bottles. Pt cares for new [address], toy poodle. Additional Comments: Pt sleeps in regular flat bed.     Objective                                                                                    Goals:  (Update in navigator)  Short Term Goals  Time Frame for Short Term Goals: 7 tx days:  Short Term Goal 1: Pt will complete OOB transfers Ind. Short Term Goal 2: Pt will ambulate >/=150 ft over level surface and at least 10 ft of uneven surface Ind. Short Term Goal 3: Pt will ascend/descend curb step using SPC and 1 flight of stairs using railing/s Mod Ind. Short Term Goal 4: Pt will complete object retrieval using reacher Mod Ind.:   :        Plan of Care                                                                              Times per week: 5 days per week for a minimum of 60 minutes/day plus group as appropriate for 60 minutes.   Treatment to include Current Treatment Recommendations: Strengthening, Balance training, Functional mobility training, Transfer training, Endurance training, Gait training, Stair training, Pain management, Home exercise program, Safety education & training, Patient/Caregiver education & training, Therapeutic activities    Electronically signed by   Amilcar Roper PT  10/10/2022, 10:34 AM

## 2022-10-15 NOTE — DISCHARGE SUMMARY
Patient Name: Cielo Restrepo  Patient :  1940  Patient MRN:   [id number]      Admission Date:  10/4/2022  Discharge Date: 10/10/2022    Admitting diagnosis: Encephalopathy ( [place] [address])     Comorbid diagnoses impacting rehabilitation: Generalized weakness, gait disturbance, toxic reaction to baclofen, [place] with hyperammonemia, permanent atrial fibrillation, essential hypertension, chronic back pain, rheumatoid arthritis. Discharging diagnosis: Encephalopathy ( [place] [address])     Comorbid diagnoses impacting rehabilitation: Generalized weakness, gait disturbance, toxic reaction to baclofen, [place] with hyperammonemia, permanent atrial fibrillation, essential hypertension, chronic back pain, rheumatoid arthritis. History of present illness: Patient is an 82-year-old right-hand-dominant female who has chronic gait disturbance related to spinal issues and chronic neck and low back pain. On 10/2/2022 she presented to an outside emergency department with mental status changes and difficulty walking. This followed a COVID-19 infection in 2022 and a recalcitrant headache that had not been responding to outpatient medications of a wide variety. Recently she had been started on Ubrelvy and baclofen for the headaches. In the ED, she was found to be delirious with generalized weakness and difficulty swallowing. Her ammonia level was up but her head CT was clear. Chest and urine was study but no signs of infection were identified. A follow-up abdominal CT showed evidence of GELLER. Blood pressures were fluctuating as well. After neurologic consultation, it was decided that she had had a drug reaction to the baclofen complicating a rise in ammonia due to GELLER (metabolic encephalopathy). She was taken off baclofen, analgesics were reduced and she was treated with lactulose. Her mental status had not yet returned to normal    Prior (baseline) level of function: Independent.     Current level of function: Current  IRF-STEFFANY and Goals:   Occupational Therapy:    Short Term Goals  Time Frame for Short Term Goals: STGs=LTGs :   Long Term Goals  Time Frame for Long Term Goals : 7-10 days or until d/c. Long Term Goal 1: Pt will complete oral hygiene and grooming tasks c Mod I.  Long Term Goal 2: Pt will complete total body bathing c AE PRN and Mod I.  Long Term Goal 3: Pt will complete UB dressing c IND. Long Term Goal 4: Pt will complete LB dressing c AE PRN and Mod I.  Long Term Goal 5: Pt will doff/don footwear c AE PRN and Mod I. Additional Goals?: Yes  Long Term Goal 6: Pt will complete toileting c Mod I.  Long Term Goal 7: Pt will perform functional transfers (bed, chair, toilet, shower) c DME PRN and Mod I.  Long Term Goal 8: Pt will perform therex/therax to facilitate an increase in strength/endurance (c emphasis on dynamic standing balance/tolerance > 8 mins) c Mod I.  Long Term Goal 9: Pt will perform home management tasks c Mod I. :                                       Eating: Eating  Assistance Needed: Independent  Comment: X  CARE Score: 6  Discharge Goal: Independent       Oral Hygiene: Oral Hygiene  Assistance Needed: Independent  Comment: IND to perform oral hygiene in stance at sink. CARE Score: 6  Discharge Goal: Independent    UB/LB Bathing: Shower/Bathe Self  Assistance Needed: Independent  Comment: Pt completed full shower with independence. CARE Score: 6  Discharge Goal: Independent    UB Dressing: Upper Body Dressing  Assistance Needed: Independent  Comment: IND, able to retrieve clothing and doff/don bra and pullover shirt. CARE Score: 6  Discharge Goal: Independent         LB Dressing: Lower Body Dressing  Assistance Needed: Independent  Comment: IND, able to retrieve clothing and doff/don underwear and pants.   CARE Score: 6  Discharge Goal: Independent    Donning and [place] Footwear: Putting On/Taking Off Footwear  Assistance Needed: Independent  Comment: IND to doff/don socks and tennis shoes. CARE Score: 6  Discharge Goal: Independent      Toiletin [address] needed: Independent  Comment: IND to manage clothing and perform perineal hygiene after episode of urination. CARE Score: 6  Discharge Goal: Independent      Toilet Transfers: Toilet Transfer  Assistance needed: Independent  Comment: IND. CARE Score: 6  Discharge Goal: Independent    Physical Therapy:   Short Term Goals  Time Frame for Short Term Goals: 7 tx days:  Short Term Goal 1: Pt will complete OOB transfers Ind. Short Term Goal 2: Pt will ambulate >/=150 ft over level surface and at least 10 ft of uneven surface Ind. Short Term Goal 3: Pt will ascend/descend curb step using SPC and 1 flight of stairs using railing/s Mod Ind. Short Term Goal 4: Pt will complete object retrieval using reacher Mod Ind.             Bed Mobility:   Sit to Lying  Assistance Needed: Independent  Comment: Ind in regular bed  CARE Score: 6  Discharge Goal: Independent  Roll Left and Right  Assistance Needed: Independent  Comment: Ind in regular bed  CARE Score: 6  Discharge Goal: Independent  Lying to Sitting on Side of Bed  Assistance Needed: Independent  Comment: Ind in regular bed  CARE Score: 6  Discharge Goal: Independent    Transfers:    Sit to Stand  Assistance Needed: Independent  Comment: Ind  CARE Score: 6  Discharge Goal: Independent  Chair/Bed-to-Chair Transfer  Assistance Needed: Independent  Comment: Ind  CARE Score: 6  Discharge Goal: Independent     Car Transfer  Assistance Needed: Independent  Comment: pt able to step in steadily using car as external support and get up Ind  CARE Score: 6  Discharge Goal: Independent    Ambulation:    Walking Ability  Does the Patient Walk?: Yes     Walk 10 Feet  Assistance Needed: Independent  Comment: Ind without AD (gait quality consistently steady)  CARE Score: 6  Discharge Goal: Independent     Walk 50 Feet with Two Turns  Assistance Needed: Independent  Comment: Ind without AD (gait quality consistently steady)  CARE Score: 6  Discharge Goal: Independent     Walk 150 Feet  Assistance Needed: Independent  Comment: Ind without AD; gait quality consistently steady  CARE Score: 6  Discharge Goal: Independent     Walking 10 Feet on Uneven Surfaces  Assistance Needed: Independent  Comment: Ind without AD over concrete, slope/ramp, rugs  CARE Score: 6  Discharge Goal: Independent     1 Step (Curb)  Assistance Needed: Independent  Comment: Mod Ind with SPC  CARE Score: 6  Discharge Goal: Independent     4 Steps  Assistance Needed: Independent  Comment: Mod Ind with L ascending/descending rail with consistent step-through pattern on ascent/descent  CARE Score: 6  Discharge Goal: Independent     12 Steps  Assistance Needed: Independent  Comment: Mod Ind with L ascending/descending rail with consistent step-through pattern on ascent/descent of regular staircase  CARE Score: 6  Discharge Goal: Independent       Wheelchair:  w/c Ability: Wheelchair Ability  Uses a Wheelchair and/or Scooter?: No                Balance:        Object: Picking Up Object  Assistance Needed: Independent  Comment: Mod Ind-Ind (pt able to perform in unsupported stance with/without use of reacher)  CARE Score: 6  Discharge Goal: Independent    I      Exam:    Blood pressure (!) 145/84, pulse 96, temperature 97.3 °F (36.3 °C), temperature source Oral, resp. rate 15, height 5' 3\" (1.6 m), weight 160 lb 15 oz (73 kg), SpO2 94 %. General: Sitting up in a wheelchair. Unable to propel it herself. Alert. In no distress. Answering direct questions. Fair insight and reasoning. HEENT: No signs of trauma to her head or neck. Gazing right and left. Supple neck. MMM. Clear speech. Pulmonary: Unlabored without wheezes, rales or coughing. Cardiac: Her rate is controlled. Occasional premature beats noted. Abdomen: Patient's abdomen is soft and nondistended. Bowel sounds were present throughout. There was no rebound, guarding or masses noted. Upper extremities: She was able to raise both hands up overhead. Functional  strength without tremor or clonus. Lower extremities: No signs of DVT. Heels clear. Symmetric strength. Sitting balance was good. Standing balance was fair-.    Lab Results   Component Value Date    WBC 7.5 10/07/2022    HGB 12.0 (L) 10/07/2022    HCT 37.0 10/07/2022    MCV 97.1 10/07/2022     10/07/2022     No results found for: INR, PROTIME  Lab Results   Component Value Date    CREATININE 0.6 10/07/2022    BUN 13 10/07/2022     10/07/2022    K 4.1 10/07/2022     10/07/2022    CO2 25 10/07/2022     No results found for: ALT, AST, GGT, ALKPHOS, BILITOT        The patient presented to the ARU with the above history requiring a multidisciplinary treatment plan including close medical supervision by the [facility] Director. The patient participated in the prescribed therapy treatment plan with reasonable compliance and progressive tolerance. They avoided significant medical complications. By the time of discharge the patient had become safer with adaptive equipment to transfer and toilet with a FWW with the supervision of family/caregivers. The patient was tolerating an oral diet without choking/coughing and was back to their baseline with regards to bowel and bladder control. Discharge instructions were reviewed with the patient and her spouse. The patient is to follow up with the PCP in 1 week. No driving. [facility] PT/OT/RN will be arranged. High Risk Drug Classes:  Use and Indication    Is taking: Check if the patient is taking any medications (or has them prescribed) by pharmacological classification, not how it is used, in the following classes  Indication noted:  If column 1 is checked, check if there is an indication noted for all meds in the drug class Is taking  (check all that apply) Indication noted (check all that apply)   Antipsychotic [] [] Anticoagulant [x] [x]   Antibiotic [] []   Opioid [] []   Antiplatelet [] []   Hypoglycemic (including insulin) [] []   None of the above []        Medication List        START taking these medications      docusate 100 MG Caps  Commonly known as: COLACE, DULCOLAX  Take 100 mg by mouth 2 times daily as needed for Constipation     lactulose 10 GM/15ML solution  Commonly known as: CHRONULAC  Take 30 mLs by mouth daily     senna 8.6 MG tablet  Commonly known as: SENOKOT  Take 1 tablet by mouth nightly as needed for Constipation            CONTINUE taking these medications      amiodarone 200 MG tablet  Commonly known as: CORDARONE     Xarelto 20 MG Tabs tablet  Generic drug: rivaroxaban            STOP taking these medications      baclofen 10 MG tablet  Commonly known as: LIORESAL     bisoprolol 5 MG tablet  Commonly known as: Neil Prude               Where to Get Your Medications        You can get these medications from any pharmacy    Bring a paper prescription for each of these medications  lactulose 10 GM/15ML solution  You don't need a prescription for these medications  docusate 100 MG Caps  senna 8.6 MG tablet           CONDITION ON DISCHARGE: Stable. The prognosis is fair for further improvements in ADL's and safety with adapted gait/transfers. Record review, patient exam, discharge instructions, medication reconciliation and summary for this discharge visit took more than 30 minutes.

## 2023-01-24 ENCOUNTER — HOSPITAL ENCOUNTER (OUTPATIENT)
Dept: GENERAL RADIOLOGY | Age: 83
Discharge: HOME | End: 2023-01-24
Payer: MEDICARE

## 2023-01-24 ENCOUNTER — HOSPITAL ENCOUNTER (OUTPATIENT)
Age: 83
Discharge: HOME | End: 2023-01-24
Payer: MEDICARE

## 2023-01-24 DIAGNOSIS — R06.02 SHORTNESS OF BREATH: ICD-10-CM

## 2023-01-24 PROCEDURE — 71046 X-RAY EXAM CHEST 2 VIEWS: CPT

## 2023-01-25 ENCOUNTER — HOSPITAL ENCOUNTER (OUTPATIENT)
Age: 83
Discharge: HOME | End: 2023-01-25
Payer: MEDICARE

## 2023-01-25 LAB
ALBUMIN SERPL-MCNC: 4.3 GM/DL (ref 3.4–5)
ALP BLD-CCNC: 60 IU/L (ref 40–129)
ALT SERPL-CCNC: 16 U/L (ref 10–40)
AST SERPL-CCNC: 17 IU/L (ref 15–37)
BILIRUB SERPL-MCNC: 0.2 MG/DL (ref 0–1)
BILIRUBIN DIRECT: 0.2 MG/DL (ref 0–0.3)
BILIRUBIN, INDIRECT: 0 MG/DL (ref 0–0.7)
T4 FREE: 1.4 NG/DL (ref 0.9–1.8)
TOTAL PROTEIN: 6.6 GM/DL (ref 6.4–8.2)
TSH HIGH SENSITIVITY: 4.18 UIU/ML (ref 0.27–4.2)

## 2023-01-25 PROCEDURE — 80076 HEPATIC FUNCTION PANEL: CPT

## 2023-01-25 PROCEDURE — 36415 COLL VENOUS BLD VENIPUNCTURE: CPT

## 2023-01-25 PROCEDURE — 84439 ASSAY OF FREE THYROXINE: CPT

## 2023-01-25 PROCEDURE — 84443 ASSAY THYROID STIM HORMONE: CPT

## 2024-05-03 ENCOUNTER — HOSPITAL ENCOUNTER (OUTPATIENT)
Dept: ULTRASOUND IMAGING | Age: 84
Discharge: HOME | End: 2024-05-03
Payer: MEDICARE

## 2024-05-03 DIAGNOSIS — R22.43 LOCALIZED SWELLING, MASS AND LUMP, LOWER LIMB, BILATERAL: ICD-10-CM

## 2024-05-03 DIAGNOSIS — Z98.1 POSTSURGICAL ARTHRODESIS STATUS: ICD-10-CM

## 2024-05-03 PROCEDURE — 93970 EXTREMITY STUDY: CPT

## 2024-06-12 ENCOUNTER — OFFICE VISIT (OUTPATIENT)
Dept: PULMONOLOGY | Age: 84
End: 2024-06-12
Payer: MEDICARE

## 2024-06-12 VITALS
HEART RATE: 90 BPM | DIASTOLIC BLOOD PRESSURE: 68 MMHG | HEIGHT: 63 IN | BODY MASS INDEX: 27.82 KG/M2 | SYSTOLIC BLOOD PRESSURE: 126 MMHG | OXYGEN SATURATION: 96 % | WEIGHT: 157 LBS

## 2024-06-12 DIAGNOSIS — R06.02 SOBOE (SHORTNESS OF BREATH ON EXERTION): Primary | ICD-10-CM

## 2024-06-12 DIAGNOSIS — J84.9 ILD (INTERSTITIAL LUNG DISEASE) (HCC): ICD-10-CM

## 2024-06-12 DIAGNOSIS — G47.33 OSA (OBSTRUCTIVE SLEEP APNEA): ICD-10-CM

## 2024-06-12 DIAGNOSIS — G47.10 HYPERSOMNIA: ICD-10-CM

## 2024-06-12 DIAGNOSIS — E66.3 OVERWEIGHT (BMI 25.0-29.9): ICD-10-CM

## 2024-06-12 PROCEDURE — 99205 OFFICE O/P NEW HI 60 MIN: CPT | Performed by: INTERNAL MEDICINE

## 2024-06-12 PROCEDURE — G8400 PT W/DXA NO RESULTS DOC: HCPCS | Performed by: INTERNAL MEDICINE

## 2024-06-12 PROCEDURE — G8419 CALC BMI OUT NRM PARAM NOF/U: HCPCS | Performed by: INTERNAL MEDICINE

## 2024-06-12 PROCEDURE — 1036F TOBACCO NON-USER: CPT | Performed by: INTERNAL MEDICINE

## 2024-06-12 PROCEDURE — 1123F ACP DISCUSS/DSCN MKR DOCD: CPT | Performed by: INTERNAL MEDICINE

## 2024-06-12 PROCEDURE — 1090F PRES/ABSN URINE INCON ASSESS: CPT | Performed by: INTERNAL MEDICINE

## 2024-06-12 PROCEDURE — 3078F DIAST BP <80 MM HG: CPT | Performed by: INTERNAL MEDICINE

## 2024-06-12 PROCEDURE — G8427 DOCREV CUR MEDS BY ELIG CLIN: HCPCS | Performed by: INTERNAL MEDICINE

## 2024-06-12 PROCEDURE — 3074F SYST BP LT 130 MM HG: CPT | Performed by: INTERNAL MEDICINE

## 2024-06-12 RX ORDER — DILTIAZEM HYDROCHLORIDE 120 MG/1
120 CAPSULE, COATED, EXTENDED RELEASE ORAL DAILY
COMMUNITY
End: 2024-06-12 | Stop reason: CLARIF

## 2024-06-12 RX ORDER — DILTIAZEM HYDROCHLORIDE 180 MG/1
180 CAPSULE, EXTENDED RELEASE ORAL DAILY
COMMUNITY
Start: 2024-05-24

## 2024-06-12 RX ORDER — HYDROCHLOROTHIAZIDE 12.5 MG/1
12.5 TABLET ORAL DAILY
COMMUNITY
Start: 2024-06-10

## 2024-06-12 ASSESSMENT — SLEEP AND FATIGUE QUESTIONNAIRES
ESS TOTAL SCORE: 6
HOW LIKELY ARE YOU TO NOD OFF OR FALL ASLEEP WHILE SITTING QUIETLY AFTER LUNCH WITHOUT ALCOHOL: WOULD NEVER DOZE
HOW LIKELY ARE YOU TO NOD OFF OR FALL ASLEEP WHILE LYING DOWN TO REST IN THE AFTERNOON WHEN CIRCUMSTANCES PERMIT: HIGH CHANCE OF DOZING
HOW LIKELY ARE YOU TO NOD OFF OR FALL ASLEEP WHILE SITTING AND TALKING TO SOMEONE: WOULD NEVER DOZE
HOW LIKELY ARE YOU TO NOD OFF OR FALL ASLEEP WHILE WATCHING TV: MODERATE CHANCE OF DOZING
HOW LIKELY ARE YOU TO NOD OFF OR FALL ASLEEP WHILE SITTING INACTIVE IN A PUBLIC PLACE: WOULD NEVER DOZE
HOW LIKELY ARE YOU TO NOD OFF OR FALL ASLEEP IN A CAR, WHILE STOPPED FOR A FEW MINUTES IN TRAFFIC: WOULD NEVER DOZE
NECK CIRCUMFERENCE (INCHES): 15
HOW LIKELY ARE YOU TO NOD OFF OR FALL ASLEEP WHEN YOU ARE A PASSENGER IN A CAR FOR AN HOUR WITHOUT A BREAK: WOULD NEVER DOZE
HOW LIKELY ARE YOU TO NOD OFF OR FALL ASLEEP WHILE SITTING AND READING: SLIGHT CHANCE OF DOZING

## 2024-06-12 ASSESSMENT — ENCOUNTER SYMPTOMS
EYE DISCHARGE: 0
SHORTNESS OF BREATH: 1
EYE ITCHING: 0
BACK PAIN: 0
ABDOMINAL DISTENTION: 0
ABDOMINAL PAIN: 0
COUGH: 1

## 2024-06-12 NOTE — ASSESSMENT & PLAN NOTE
She has symptoms of TANIA  Advised to go for the PSG  Loose weight  Sleep Hygiene measures  Avoid back sleep till sleep study  Driving precautions  Medical consequences of untreated TANIA

## 2024-06-12 NOTE — PROGRESS NOTES
Emily Oropezaangelineangel  1940  Referring Provider: Mellissa Mohan MDRef Provider     Subjective:     Chief Complaint   Patient presents with    Shortness of Breath     Started about 2mo ago, has gotten worse. No cough, states that she does have a lot of mucus but she's not coughing any of it up. Was diagnosed with Afib about 2yrs ago after a bout of pnuemonia. Just came back into the country after 55 years in [place].   Has PFT scheduled for 6/26  ESS & Neck completed        HPI  Emily is a 84 y.o. female has been referred for the SOB x 2 months. Her last Pneumonia was about 2 years ago in [place]. [place] y[place]. She has RA for many years. She was not on MTX. She has Afib for which she is on Amiodarone for the last couple of years.Her last CXR done on 01/24/23 showed:    The lungs are without acute focal process.  There is no effusion or  pneumothorax. The cardiomediastinal silhouette is without acute process. The  osseous structures are without acute process.    She has no cough, phlegm-clear to yellow, no hemoptysis, 6 lb weight loss, fair appetite, SOBOE  30 feet. She is not on oxygen. She is not on any inhalers. She is not a smoker. She was on Nitrofurantoin when she was in [place]. Her ECHO don tiffany 05/21/24 showed that she has an EF65%, Diastolic dysfunction, mild Pulmonary HTN and Moderate MR.    She snores and not sure if she stops breathing . She goes to bed at 10 PM and gets up at 10 AM and she is tired . She has no morning headaches. She is sleepy and tired during the day time. She never woke up choking and gasping for air, woke up with dry mouth, no palpitations.     Current Outpatient Medications   Medication Sig Dispense Refill    hydroCHLOROthiazide 12.5 MG tablet Take 1 tablet by mouth daily      dilTIAZem (TIAZAC) 180 MG extended release capsule Take 1 capsule by mouth daily      docusate sodium (COLACE, DULCOLAX) 100 MG CAPS Take 100 mg by mouth 2 times

## 2024-06-12 NOTE — ASSESSMENT & PLAN NOTE
At this time it appears to be sec to obesity,deconditioning, Diastolic dysfunction, Moderate MR and Mild Pulmonary HTN  Since she has RA and also on Amiodarone  I'll do a PFT  6 MWT  HRCT of chest  Get yearly flu vaccine

## 2024-06-22 ENCOUNTER — HOSPITAL ENCOUNTER (OUTPATIENT)
Dept: SLEEP CENTER | Age: 84
Discharge: HOME | End: 2024-06-22
Attending: INTERNAL MEDICINE
Payer: MEDICARE

## 2024-06-22 DIAGNOSIS — G47.33 OSA (OBSTRUCTIVE SLEEP APNEA): ICD-10-CM

## 2024-06-22 PROCEDURE — 95810 POLYSOM 6/> YRS 4/> PARAM: CPT

## 2024-06-26 ENCOUNTER — HOSPITAL ENCOUNTER (OUTPATIENT)
Dept: CT IMAGING | Age: 84
Discharge: HOME | End: 2024-06-26
Attending: INTERNAL MEDICINE
Payer: MEDICARE

## 2024-06-26 ENCOUNTER — HOSPITAL ENCOUNTER (OUTPATIENT)
Dept: PULMONOLOGY | Age: 84
Discharge: HOME | End: 2024-06-26
Payer: MEDICARE

## 2024-06-26 DIAGNOSIS — J84.9 ILD (INTERSTITIAL LUNG DISEASE) (HCC): ICD-10-CM

## 2024-06-26 DIAGNOSIS — R06.02 SOBOE (SHORTNESS OF BREATH ON EXERTION): ICD-10-CM

## 2024-06-26 DIAGNOSIS — R06.02 SHORTNESS OF BREATH: ICD-10-CM

## 2024-06-26 LAB
DLCO %PRED: 54 %
DLCO PRED: NORMAL
DLCO/VA %PRED: 81 %
DLCO/VA PRED: NORMAL
DLCO/VA: NORMAL
DLCO: NORMAL
EXPIRATORY TIME-POST: NORMAL
EXPIRATORY TIME: NORMAL
FEF 25-75 %CHNG: NORMAL
FEF 25-75 POST %PRED: 32 %
FEF 25-75% %PRED-PRE: 142 L/SEC
FEF 25-75% PRED: NORMAL
FEF 25-75-POST: NORMAL
FEF 25-75-PRE: NORMAL
FEV1 %PRED-POST: 42 %
FEV1 %PRED-PRE: 108 %
FEV1 PRED: NORMAL
FEV1-POST: NORMAL
FEV1-PRE: NORMAL
FEV1/FVC %PRED-POST: 41 %
FEV1/FVC %PRED-PRE: 105 %
FEV1/FVC PRED: NORMAL
FEV1/FVC-POST: NORMAL
FEV1/FVC-PRE: NORMAL
FVC %PRED-POST: 101 L
FVC %PRED-PRE: 101 %
FVC PRED: NORMAL
FVC-POST: NORMAL
FVC-PRE: NORMAL
GAW %PRED: NORMAL
GAW PRED: NORMAL
GAW: NORMAL
IC PRE %PRED: 107 %
IC PRED: NORMAL
IC: NORMAL
MEP: NORMAL
MIP: NORMAL
MVV %PRED-PRE: NORMAL
MVV PRED: NORMAL
MVV-PRE: NORMAL
PEF %PRED-POST: NORMAL
PEF %PRED-PRE: NORMAL
PEF PRED: NORMAL
PEF%CHNG: NORMAL
PEF-POST: NORMAL
PEF-PRE: NORMAL
RAW %PRED: NORMAL
RAW PRED: NORMAL
RAW: NORMAL
RV PRE %PRED: 61 %
RV PRED: NORMAL
RV: NORMAL
SVC %PRED: 105 %
SVC PRED: NORMAL
SVC: NORMAL
TLC PRE %PRED: 77 %
TLC PRED: NORMAL
TLC: NORMAL
VA %PRED: 67 %
VA PRED: NORMAL
VA: NORMAL
VTG %PRED: NORMAL
VTG PRED: NORMAL
VTG: NORMAL

## 2024-06-26 PROCEDURE — 94060 EVALUATION OF WHEEZING: CPT

## 2024-06-26 PROCEDURE — 94727 GAS DIL/WSHOT DETER LNG VOL: CPT

## 2024-06-26 PROCEDURE — 94729 DIFFUSING CAPACITY: CPT

## 2024-06-26 PROCEDURE — 71250 CT THORAX DX C-: CPT

## 2024-06-26 PROCEDURE — 94010 BREATHING CAPACITY TEST: CPT

## 2024-06-26 ASSESSMENT — PULMONARY FUNCTION TESTS
FEV1/FVC_PERCENT_PREDICTED_POST: 41
FEV1_PERCENT_PREDICTED_PRE: 108
FVC_PERCENT_PREDICTED_POST: 101
FEV1_PERCENT_PREDICTED_POST: 42
FVC_PERCENT_PREDICTED_PRE: 101
FEV1/FVC_PERCENT_PREDICTED_PRE: 105

## 2024-07-02 ENCOUNTER — OFFICE VISIT (OUTPATIENT)
Dept: PULMONOLOGY | Age: 84
End: 2024-07-02
Payer: MEDICARE

## 2024-07-02 VITALS
WEIGHT: 156 LBS | OXYGEN SATURATION: 97 % | RESPIRATION RATE: 16 BRPM | HEIGHT: 63 IN | SYSTOLIC BLOOD PRESSURE: 128 MMHG | HEART RATE: 88 BPM | BODY MASS INDEX: 27.64 KG/M2 | DIASTOLIC BLOOD PRESSURE: 74 MMHG

## 2024-07-02 DIAGNOSIS — R06.02 SOBOE (SHORTNESS OF BREATH ON EXERTION): ICD-10-CM

## 2024-07-02 DIAGNOSIS — E66.3 OVERWEIGHT (BMI 25.0-29.9): ICD-10-CM

## 2024-07-02 DIAGNOSIS — G47.33 OSA (OBSTRUCTIVE SLEEP APNEA): ICD-10-CM

## 2024-07-02 DIAGNOSIS — G47.10 HYPERSOMNIA: ICD-10-CM

## 2024-07-02 DIAGNOSIS — J84.9 ILD (INTERSTITIAL LUNG DISEASE) (HCC): Primary | ICD-10-CM

## 2024-07-02 PROCEDURE — G8427 DOCREV CUR MEDS BY ELIG CLIN: HCPCS | Performed by: INTERNAL MEDICINE

## 2024-07-02 PROCEDURE — 3078F DIAST BP <80 MM HG: CPT | Performed by: INTERNAL MEDICINE

## 2024-07-02 PROCEDURE — 1036F TOBACCO NON-USER: CPT | Performed by: INTERNAL MEDICINE

## 2024-07-02 PROCEDURE — 99215 OFFICE O/P EST HI 40 MIN: CPT | Performed by: INTERNAL MEDICINE

## 2024-07-02 PROCEDURE — 1123F ACP DISCUSS/DSCN MKR DOCD: CPT | Performed by: INTERNAL MEDICINE

## 2024-07-02 PROCEDURE — 3074F SYST BP LT 130 MM HG: CPT | Performed by: INTERNAL MEDICINE

## 2024-07-02 PROCEDURE — 1090F PRES/ABSN URINE INCON ASSESS: CPT | Performed by: INTERNAL MEDICINE

## 2024-07-02 PROCEDURE — G8419 CALC BMI OUT NRM PARAM NOF/U: HCPCS | Performed by: INTERNAL MEDICINE

## 2024-07-02 PROCEDURE — G8400 PT W/DXA NO RESULTS DOC: HCPCS | Performed by: INTERNAL MEDICINE

## 2024-07-02 RX ORDER — PREDNISONE 10 MG/1
TABLET ORAL
Qty: 30 TABLET | Refills: 0 | Status: SHIPPED | OUTPATIENT
Start: 2024-07-02

## 2024-07-02 ASSESSMENT — ENCOUNTER SYMPTOMS
SHORTNESS OF BREATH: 1
EYE DISCHARGE: 0
BACK PAIN: 0
ABDOMINAL DISTENTION: 0
COUGH: 0
ABDOMINAL PAIN: 0
EYE ITCHING: 0

## 2024-07-02 NOTE — ASSESSMENT & PLAN NOTE
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

## 2024-07-02 NOTE — ASSESSMENT & PLAN NOTE
At this time it appears to be sec to obesity,deconditioning, Diastolic dysfunction, Moderate MR and Mild Pulmonary HTN  and ILD  Loose weight  Advised to change Amiodarone  Prednisone taper  PFT, 6 MWT and HRCT of chest in 6 months

## 2024-07-02 NOTE — PROGRESS NOTES
Emily Oropezaangelineangel  1940  Referring Provider: Yu Ruiz, LEVI - [facility] - General     Subjective:     Chief Complaint   Patient presents with    Follow-up    Results     Discuss PFT/ CT and SS       HPI  Emily is a 84 y.o. female has come back as a follow up. She was seen for the SOB. She has RA not on MTX. She has Afib on Amiodarone. She has occasional cough, lot of phlegm-clear, no hemoptysis, no loss of weight, good appetite, SOBOE. Her HRCT of chest showed suspected ILD with GG opacities and traction bronchiectasis. Await official result. Her PFT done on 06/26/24 showed that She has Moderate decrease in DLCO of 54%.    She had a PSG done on 06/22/24 showed that she has severe TANIA with an AHI of    54.6 and desat to 80%. She has no loss of weight. She is still sleepy and tired during the day time.      Current Outpatient Medications   Medication Sig Dispense Refill    predniSONE (DELTASONE) 10 MG tablet Take 4 tabs x 3 days, 3 tabs x 3 days, 2 tabs x 3 days, 1 tab x 3 days 30 tablet 0    hydroCHLOROthiazide 12.5 MG tablet Take 1 tablet by mouth daily      dilTIAZem (TIAZAC) 180 MG extended release capsule Take 1 capsule by mouth daily      docusate sodium (COLACE, DULCOLAX) 100 MG CAPS Take 100 mg by mouth 2 times daily as needed for Constipation      amiodarone (CORDARONE) 200 MG tablet Take 0.5 tablets by mouth daily      XARELTO 20 MG TABS tablet Take 1 tablet by mouth daily       No current facility-administered medications for this visit.       No Known Allergies    No past medical history on file.    No past surgical history on file.    Social History     Socioeconomic History    Marital status:      Spouse name: None    Number of children: None    Years of education: None    Highest education level: None   Tobacco Use    Smoking status: Never    Smokeless tobacco: Never   Substance and Sexual Activity    Alcohol use: Never    Drug use: Never       Review of Systems   Constitutional:

## 2024-08-02 ENCOUNTER — HOSPITAL ENCOUNTER (OUTPATIENT)
Dept: SLEEP CENTER | Age: 84
Discharge: HOME | End: 2024-08-02
Payer: MEDICARE

## 2024-08-02 DIAGNOSIS — G47.33 OSA (OBSTRUCTIVE SLEEP APNEA): ICD-10-CM

## 2024-08-02 PROCEDURE — 95811 POLYSOM 6/>YRS CPAP 4/> PARM: CPT

## 2024-09-30 ENCOUNTER — OFFICE VISIT (OUTPATIENT)
Dept: PULMONOLOGY | Age: 84
End: 2024-09-30
Payer: MEDICARE

## 2024-09-30 VITALS
HEIGHT: 63 IN | OXYGEN SATURATION: 96 % | BODY MASS INDEX: 28.07 KG/M2 | HEART RATE: 75 BPM | WEIGHT: 158.4 LBS | SYSTOLIC BLOOD PRESSURE: 128 MMHG | DIASTOLIC BLOOD PRESSURE: 74 MMHG

## 2024-09-30 DIAGNOSIS — J84.9 ILD (INTERSTITIAL LUNG DISEASE) (HCC): Primary | ICD-10-CM

## 2024-09-30 DIAGNOSIS — G47.39 COMPLEX SLEEP APNEA SYNDROME: ICD-10-CM

## 2024-09-30 DIAGNOSIS — G47.10 HYPERSOMNIA: ICD-10-CM

## 2024-09-30 DIAGNOSIS — R06.02 SOBOE (SHORTNESS OF BREATH ON EXERTION): ICD-10-CM

## 2024-09-30 DIAGNOSIS — E66.3 OVERWEIGHT (BMI 25.0-29.9): ICD-10-CM

## 2024-09-30 PROCEDURE — 1090F PRES/ABSN URINE INCON ASSESS: CPT | Performed by: INTERNAL MEDICINE

## 2024-09-30 PROCEDURE — 1036F TOBACCO NON-USER: CPT | Performed by: INTERNAL MEDICINE

## 2024-09-30 PROCEDURE — G8400 PT W/DXA NO RESULTS DOC: HCPCS | Performed by: INTERNAL MEDICINE

## 2024-09-30 PROCEDURE — 3078F DIAST BP <80 MM HG: CPT | Performed by: INTERNAL MEDICINE

## 2024-09-30 PROCEDURE — 1123F ACP DISCUSS/DSCN MKR DOCD: CPT | Performed by: INTERNAL MEDICINE

## 2024-09-30 PROCEDURE — G8419 CALC BMI OUT NRM PARAM NOF/U: HCPCS | Performed by: INTERNAL MEDICINE

## 2024-09-30 PROCEDURE — G8427 DOCREV CUR MEDS BY ELIG CLIN: HCPCS | Performed by: INTERNAL MEDICINE

## 2024-09-30 PROCEDURE — 3074F SYST BP LT 130 MM HG: CPT | Performed by: INTERNAL MEDICINE

## 2024-09-30 PROCEDURE — 99215 OFFICE O/P EST HI 40 MIN: CPT | Performed by: INTERNAL MEDICINE

## 2024-09-30 RX ORDER — TRAZODONE HYDROCHLORIDE 100 MG/1
100 TABLET ORAL NIGHTLY
COMMUNITY
Start: 2024-07-08

## 2024-09-30 RX ORDER — APIXABAN 5 MG/1
5 TABLET, FILM COATED ORAL 2 TIMES DAILY
COMMUNITY

## 2024-09-30 ASSESSMENT — ENCOUNTER SYMPTOMS
ABDOMINAL PAIN: 0
EYE DISCHARGE: 0
SHORTNESS OF BREATH: 0
BACK PAIN: 0
ABDOMINAL DISTENTION: 0
EYE ITCHING: 0
COUGH: 0

## 2024-09-30 NOTE — PROGRESS NOTES
Emily Oropezaangelineangel  1940  Referring Provider: Yu Ruiz, LEVI - [facility] - General     Subjective:     Chief Complaint   Patient presents with    Follow-up     Compliance        HPI  Emily is a 84 y.o. female has come back as a follow up. She has Complex Sleep Apnea. She is on ASV which she is using it every night about 7 to 8 hours. She says that it is helping her. She has no loss of weight. She has a nasal mask. She is not sleepy or tired during the day time. Her 2 week download data showed a residual AHI is 0.6 and leak is 7.4 L.min.    She was seen for the SOB.HRCT of chest done on 07/08/24 showed:    1. Groundglass opacities in the lung bases. Please correlate for pneumonia.  2. Emphysema.  3. Cardiomegaly.  4. Hepatomegaly.     She is of Amiodarone. She has isolated moderate decrease in DLCO.       Current Outpatient Medications   Medication Sig Dispense Refill    Cholecalciferol 1.25 MG (53695 UT) TABS Take 1.25 mg by mouth daily      traZODone (DESYREL) 100 MG tablet Take 1 tablet by mouth nightly      predniSONE (DELTASONE) 10 MG tablet Take 4 tabs x 3 days, 3 tabs x 3 days, 2 tabs x 3 days, 1 tab x 3 days 30 tablet 0    hydroCHLOROthiazide 12.5 MG tablet Take 1 tablet by mouth daily      dilTIAZem (TIAZAC) 180 MG extended release capsule Take 1 capsule by mouth daily      docusate sodium (COLACE, DULCOLAX) 100 MG CAPS Take 100 mg by mouth 2 times daily as needed for Constipation      XARELTO 20 MG TABS tablet Take 1 tablet by mouth daily      ELIQUIS 5 MG TABS tablet Take 1 tablet by mouth 2 times daily      amiodarone (CORDARONE) 200 MG tablet Take 0.5 tablets by mouth daily (Patient not taking: Reported on 8/2/2024)       No current facility-administered medications for this visit.       No Known Allergies    No past medical history on file.    No past surgical history on file.    Social History     Socioeconomic History    Marital status:    Tobacco Use    Smoking status: Never

## 2024-09-30 NOTE — ASSESSMENT & PLAN NOTE
She has minimal ILD  She has minimal symptoms  She is off Amiodarone  Repeat HRCT of chest in 1 year  PFT in 1 year  Get yearly flu vaccine

## 2025-03-06 ENCOUNTER — OFFICE VISIT (OUTPATIENT)
Dept: PULMONOLOGY | Age: 85
End: 2025-03-06
Payer: MEDICARE

## 2025-03-06 VITALS
SYSTOLIC BLOOD PRESSURE: 118 MMHG | OXYGEN SATURATION: 95 % | WEIGHT: 146.2 LBS | HEART RATE: 85 BPM | DIASTOLIC BLOOD PRESSURE: 68 MMHG | HEIGHT: 64 IN | BODY MASS INDEX: 24.96 KG/M2

## 2025-03-06 DIAGNOSIS — G47.10 HYPERSOMNIA: ICD-10-CM

## 2025-03-06 DIAGNOSIS — J84.9 ILD (INTERSTITIAL LUNG DISEASE) (HCC): ICD-10-CM

## 2025-03-06 DIAGNOSIS — G47.39 COMPLEX SLEEP APNEA SYNDROME: Primary | ICD-10-CM

## 2025-03-06 DIAGNOSIS — R06.02 SOBOE (SHORTNESS OF BREATH ON EXERTION): ICD-10-CM

## 2025-03-06 PROCEDURE — 1123F ACP DISCUSS/DSCN MKR DOCD: CPT | Performed by: INTERNAL MEDICINE

## 2025-03-06 PROCEDURE — 3074F SYST BP LT 130 MM HG: CPT | Performed by: INTERNAL MEDICINE

## 2025-03-06 PROCEDURE — 3078F DIAST BP <80 MM HG: CPT | Performed by: INTERNAL MEDICINE

## 2025-03-06 PROCEDURE — G8399 PT W/DXA RESULTS DOCUMENT: HCPCS | Performed by: INTERNAL MEDICINE

## 2025-03-06 PROCEDURE — 1090F PRES/ABSN URINE INCON ASSESS: CPT | Performed by: INTERNAL MEDICINE

## 2025-03-06 PROCEDURE — G8419 CALC BMI OUT NRM PARAM NOF/U: HCPCS | Performed by: INTERNAL MEDICINE

## 2025-03-06 PROCEDURE — G8427 DOCREV CUR MEDS BY ELIG CLIN: HCPCS | Performed by: INTERNAL MEDICINE

## 2025-03-06 PROCEDURE — 1036F TOBACCO NON-USER: CPT | Performed by: INTERNAL MEDICINE

## 2025-03-06 PROCEDURE — 99215 OFFICE O/P EST HI 40 MIN: CPT | Performed by: INTERNAL MEDICINE

## 2025-03-06 PROCEDURE — 1159F MED LIST DOCD IN RCRD: CPT | Performed by: INTERNAL MEDICINE

## 2025-03-06 RX ORDER — METHOCARBAMOL 500 MG/1
500 TABLET, FILM COATED ORAL 3 TIMES DAILY
COMMUNITY

## 2025-03-06 ASSESSMENT — ENCOUNTER SYMPTOMS
ABDOMINAL PAIN: 0
BACK PAIN: 0
COUGH: 0
ABDOMINAL DISTENTION: 0
EYE DISCHARGE: 0
EYE ITCHING: 0
SHORTNESS OF BREATH: 0

## 2025-03-06 NOTE — PROGRESS NOTES
to be compliant with the ASV  Loose weight            Other    SOBOE (shortness of breath on exertion)       Improved         Hypersomnia       Advised to be compliant with the ASV  Loose weight                 Total time spent for this encounter: 40 mins    Follow-Up:    Return in about 6 months (around 9/15/2025) for PFT, CT chest.     Progress notes sent to the referring Provider    Salvador Thomas MD MD  3/6/2025  2:11 PM

## 2025-03-06 NOTE — ASSESSMENT & PLAN NOTE
She has minimal ILD  She has minimal symptoms  She is off Amiodarone  Repeat HRCT of chest  and PFT in Sept'25  Get yearly flu vaccine